# Patient Record
Sex: FEMALE | Race: WHITE | NOT HISPANIC OR LATINO | Employment: PART TIME | URBAN - METROPOLITAN AREA
[De-identification: names, ages, dates, MRNs, and addresses within clinical notes are randomized per-mention and may not be internally consistent; named-entity substitution may affect disease eponyms.]

---

## 2019-04-29 ENCOUNTER — OFFICE VISIT (OUTPATIENT)
Dept: NEUROLOGY | Facility: MEDICAL CENTER | Age: 69
End: 2019-04-29
Payer: MEDICARE

## 2019-04-29 VITALS
WEIGHT: 176 LBS | HEIGHT: 64 IN | TEMPERATURE: 97.5 F | BODY MASS INDEX: 30.05 KG/M2 | DIASTOLIC BLOOD PRESSURE: 72 MMHG | SYSTOLIC BLOOD PRESSURE: 146 MMHG | HEART RATE: 65 BPM | OXYGEN SATURATION: 96 %

## 2019-04-29 DIAGNOSIS — E78.1 HIGH TRIGLYCERIDES: ICD-10-CM

## 2019-04-29 DIAGNOSIS — E13.69 OTHER SPECIFIED DIABETES MELLITUS WITH OTHER SPECIFIED COMPLICATION, WITHOUT LONG-TERM CURRENT USE OF INSULIN (HCC): ICD-10-CM

## 2019-04-29 DIAGNOSIS — G62.9 NEUROPATHY: ICD-10-CM

## 2019-04-29 PROCEDURE — 99204 OFFICE O/P NEW MOD 45 MIN: CPT | Performed by: PSYCHIATRY & NEUROLOGY

## 2019-04-29 RX ORDER — GABAPENTIN 300 MG/1
300 CAPSULE ORAL 3 TIMES DAILY
COMMUNITY
End: 2022-12-31

## 2019-04-29 NOTE — PATIENT INSTRUCTIONS
"  The numbness is up to the high calf    IMPRESSION:    1. Neuropathy since age of 40+ ---   2. Familiar Triglyceridemia 1200 +before treament  3. Neuropathic Pain due to 1--status post surgeries for hammer toes 2017    PLAN/RECOMMENDATIONS:      Advise the patient to watch the skin of feet carefully daily  Avoid infection       ________________________________________________________________________    Fish Oil -- Omega 3 1000mg 6# daily ( if trouble tolerating Fish oil, Flaxseed oil is a alternative solution  ________________________________________________________________________    After fish oil 6000mg for one month, repeat lipid profile test one month after fish oil  Monitor the nerve damage with the level of tingling and neuropathy        SIGNATURE:  Felipe Singh      LIPID PANEL1/27/2015  [facility]  Component Name Value Ref Range   CHOLESTEROL 193 100 - 200 mg/dL   TRIGLYCERIDES 479 (H) 0 - 150 mg/dL   HDL CHOLESTEROL 33 (L) 45 - 100 mg/dL   NON-HDL CHOLESTEROL 160   Comment:  Based on current National Cholesterol Education Program  recommendations for LDL and the [facility] laboratory's upper limit  of normal for triglyceride of 150 mg/dL, the expected value  for Non-HDL Cholesterol is < 130 mg/dL. mg/dL   LDL CHOLESTEROL see below   Comment:  The Triglyceride result was greater than 400 mg/dL, therefore  the calculated LDL result is invalid. 0 - 100 mg/dL   CHOLESTEROL HDL RATI 5.8 (H)   Comment:  Data for various studies suggest that the ratio of the Total  CHOL/HDL may provide a \"rule of thumb\" guide in the predicting  increased risk to Coronary Heart Disease.  RISK                  MEN              WOMEN  1/2 risk              3.43             3.27  Average risk          4.97             4.44  2X Average            9.55             7.05  3X Average           23.99            11.04            CC:  Lito Baker, " M.D.      ________________________________________________________________________      Hypertriglyceridemia and peripheral neuropathy might be related-- please see the following reference    PREFERENCE whether to add one more medicine to control Hypertriglyceridemia    Combination therapy raises safety concerns. All statins (especially at higher doses) increase the risk of rhabdomyolysis; this risk may be compounded by fibrate use. Cerivastatin (Baycol) was withdrawn from the market because of reports of fatal rhabdomyolysis, often in patients also taking gem-fibrozil (Lopid). An increased risk also has been shown with rosuvastatin (Crestor).18,19 When combined with statins, gemfibrozil may increase serum statin levels by inhibiting statin metabolism.    Compared with gemfibrozil/statin therapy, fenofibrate/statin therapy has a lower incidence and reported rate of rhabdomyolysis and may be safer.20,21 However, long-term safety and outcome data for fibrate/statin combinations are lacking, and combination therapy should be used with caution. Patients should receive the lowest possible statin dosage, be monitored closely for side effects (e.g., muscle pain, brown urine), and be given the opportunity for proper informed consent.    Neuro Endocrinol Nellie. 2005 Dec;26(6):775-9. Hypertriglyceridemia and peripheral neuropathy in neurologically asymptomatic patients.  Hamilton HS1, [place] ST, Sapphire MS, Osvaldo RA.  ________________________________________________________________________

## 2019-04-29 NOTE — PROGRESS NOTES
"NEUROLOGY NOTE    Referring Physician  Lito Baker M.D.      CHIEF COMPLAINT:  The patient started having neuropathy since age of 39+  Underwent extensive work up in [facility]  hypertriglycemia is noticed  Chief Complaint   Patient presents with   • Establish Care     Neuropathy       PRESENT ILLNESS:   The patient started having neuropathy since age of 39+  Underwent extensive work up in [facility]  hypertriglycemia was noticed    Taking fish oil and alphalipoic acid for years  1800mg neurontin     The patient noticed sugar would make the numbness worse-- affecting the nose    PAST MEDICAL HISTORY:  No past medical history on file.    PAST SURGICAL HISTORY:  No past surgical history on file.    FAMILY HISTORY:  No family history on file.    SOCIAL HISTORY:  Social History     Social History   • Marital status: Single     Spouse name: N/A   • Number of children: N/A   • Years of education: N/A     Occupational History   • Not on file.     Social History Main Topics   • Smoking status: Not on file   • Smokeless tobacco: Not on file   • Alcohol use Not on file   • Drug use: Unknown   • Sexual activity: Not on file     Other Topics Concern   • Not on file     Social History Narrative   • No narrative on file     ALLERGIES:  Allergies   Allergen Reactions   • Aspirin Hives   • Celebrex [Celecoxib] Rash     All over body   • Kjvxqwkw-Pxactyh-Trpmja [Fluocinolone]      Torn achilles heel   • Levaquin      Torn achilles heel   • Other Drug Vomiting and Nausea     \"most pain medication (specifically opiods)\"     Increased heart rate and upset stomach     TOBHX  History   Smoking Status   • Not on file   Smokeless Tobacco   • Not on file     ALCHX  History   Alcohol use Not on file     DRUGHX  History   Drug use: Unknown           MEDICATIONS:  Current Outpatient Prescriptions   Medication   • gabapentin (NEURONTIN) 300 MG Cap   • FENOFIBRATE PO   • LOSARTAN POTASSIUM PO     No current facility-administered medications for " "this visit.        REVIEW OF SYSTEM:    Constitutional: Denies fevers, Denies weight changes   Eyes: Denies changes in vision, no eye pain   Ears/Nose/Throat/Mouth: Denies nasal congestion or sore throat   Cardiovascular: Denies chest pain or palpitations   Respiratory: Denies SOB.   Gastrointestinal/Hepatic: Denies abdominal pain, nausea, vomiting, diarrhea, constipation or GI bleeding   Genitourinary: Denies bladder dysfunction, dysuria or frequency   Musculoskeletal/Rheum: Denies joint pain and swelling   Skin/Breast: Denies rash, denies breast lumps or discharge   Neurological: polyneuropathy  Psychiatric: denies mood disorder   Endocrine: denies hx of diabetes or thyroid dysfunction   Heme/Oncology/Lymph Nodes: Denies enlarged lymph nodes, denies brusing or known bleeding disorder   Allergic/Immunologic: Denies hx of allergies         PHYSICAL AND NEUROLOGICAL EXMAINATIONS:  VITAL SIGNS: /72   Pulse 65   Temp 36.4 °C (97.5 °F) (Temporal)   Ht 1.613 m (5' 3.5\")   Wt 79.8 kg (176 lb)   SpO2 96%   BMI 30.69 kg/m²   CURRENT WEIGHT:   BMI: Body mass index is 30.69 kg/m².  PREVIOUS WEIGHTS:  Wt Readings from Last 25 Encounters:   04/29/19 79.8 kg (176 lb)       General appearance of patient: WDWN(+) NAD(+)    EYES  o Fundus : Papilledem(-) Exudates(-) Hemorrhage(-)  Nervous System  Orientation to time, place and person(+)  Memory normal(+)  Language: aphasia(-)  Knowledge: past(+) Current(+)  Attention(+)  Cranial Nerves  • Nerve 2: intact  • Nerve 3,4,6: intact  • Nerve 5 : intact  • Nerve 7: intact  • Nerve 8: intact  • Nerve 9 & 10: intact  • Nerve 11: intact  • Nerve 12: intact  Muscle Power and muscle tone: symmetric, normal in upper and lower  Sensory System: decreased sensation in the legs  Reflexes: symmetric throughout  Cerebellar Function FNP normal   Gait : Steady(+) TandemGait steady(+)  Heart and Vascular  Peripheral Vasucular system : Edema (-) Swelling(-)  RHB, Breathing sound " "clear  abdomen bowel sound normoactive  Extremities freely moveable  Joints no contracture       NEUROIMAGING: I reviewed the MRI/CT of brain       LAB:        The numbness is up to the high calf    IMPRESSION:    1. Neuropathy since age of 40+ ---   2. Familiar Triglyceridemia 1200 +before treament  3. Neuropathic Pain due to 1--status post surgeries for hammer toes 2017    PLAN/RECOMMENDATIONS:      Advise the patient to watch the skin of feet carefully daily  Avoid infection       ________________________________________________________________________    Fish Oil -- Omega 3 1000mg 6# daily ( if trouble tolerating Fish oil, Flaxseed oil is a alternative solution  ________________________________________________________________________    After fish oil 6000mg for one month, repeat lipid profile test one month after fish oil  Monitor the nerve damage with the level of tingling and neuropathy        SIGNATURE:  WilliamJames Singh      LIPID PANEL1/27/2015  [facility]  Component Name Value Ref Range   CHOLESTEROL 193 100 - 200 mg/dL   TRIGLYCERIDES 479 (H) 0 - 150 mg/dL   HDL CHOLESTEROL 33 (L) 45 - 100 mg/dL   NON-HDL CHOLESTEROL 160   Comment:  Based on current National Cholesterol Education Program  recommendations for LDL and the [facility] laboratory's upper limit  of normal for triglyceride of 150 mg/dL, the expected value  for Non-HDL Cholesterol is < 130 mg/dL. mg/dL   LDL CHOLESTEROL see below   Comment:  The Triglyceride result was greater than 400 mg/dL, therefore  the calculated LDL result is invalid. 0 - 100 mg/dL   CHOLESTEROL HDL RATI 5.8 (H)   Comment:  Data for various studies suggest that the ratio of the Total  CHOL/HDL may provide a \"rule of thumb\" guide in the predicting  increased risk to Coronary Heart Disease.  RISK                  MEN              WOMEN  1/2 risk              3.43             3.27  Average risk          4.97             4.44  2X Average            9.55             7.05  3X Average           " 23.99            11.04            CC:  Lito Baker M.D.      ________________________________________________________________________      Hypertriglyceridemia and peripheral neuropathy might be related-- please see the following reference    PREFERENCE whether to add one more medicine to control Hypertriglyceridemia    Combination therapy raises safety concerns. All statins (especially at higher doses) increase the risk of rhabdomyolysis; this risk may be compounded by fibrate use. Cerivastatin (Baycol) was withdrawn from the market because of reports of fatal rhabdomyolysis, often in patients also taking gem-fibrozil (Lopid). An increased risk also has been shown with rosuvastatin (Crestor).18,19 When combined with statins, gemfibrozil may increase serum statin levels by inhibiting statin metabolism.    Compared with gemfibrozil/statin therapy, fenofibrate/statin therapy has a lower incidence and reported rate of rhabdomyolysis and may be safer.20,21 However, long-term safety and outcome data for fibrate/statin combinations are lacking, and combination therapy should be used with caution. Patients should receive the lowest possible statin dosage, be monitored closely for side effects (e.g., muscle pain, brown urine), and be given the opportunity for proper informed consent.    Neuro Endocrinol Nellie. 2005 Dec;26(6):775-9. Hypertriglyceridemia and peripheral neuropathy in neurologically asymptomatic patients.  Hamilton HS1, [place] ST, Sapphire MS, Osvaldo RA.  ________________________________________________________________________

## 2019-05-01 ENCOUNTER — HOSPITAL ENCOUNTER (OUTPATIENT)
Dept: LAB | Facility: MEDICAL CENTER | Age: 69
End: 2019-05-01
Attending: PSYCHIATRY & NEUROLOGY
Payer: MEDICARE

## 2019-05-01 DIAGNOSIS — E78.1 HIGH TRIGLYCERIDES: ICD-10-CM

## 2019-05-01 DIAGNOSIS — G62.9 NEUROPATHY: ICD-10-CM

## 2019-05-01 DIAGNOSIS — E13.69 OTHER SPECIFIED DIABETES MELLITUS WITH OTHER SPECIFIED COMPLICATION, WITHOUT LONG-TERM CURRENT USE OF INSULIN (HCC): ICD-10-CM

## 2019-05-01 LAB
CHOLEST SERPL-MCNC: 203 MG/DL (ref 100–199)
FASTING STATUS PATIENT QL REPORTED: NORMAL
HDLC SERPL-MCNC: 32 MG/DL
LDLC SERPL CALC-MCNC: ABNORMAL MG/DL
TRIGL SERPL-MCNC: 461 MG/DL (ref 0–149)

## 2019-05-01 PROCEDURE — 84207 ASSAY OF VITAMIN B-6: CPT

## 2019-05-01 PROCEDURE — 36415 COLL VENOUS BLD VENIPUNCTURE: CPT

## 2019-05-01 PROCEDURE — 84425 ASSAY OF VITAMIN B-1: CPT

## 2019-05-01 PROCEDURE — 80061 LIPID PANEL: CPT

## 2019-05-04 LAB
VIT B1 BLD-MCNC: 144 NMOL/L (ref 70–180)
VIT B6 SERPL-MCNC: 206.1 NMOL/L (ref 20–125)

## 2019-05-27 ENCOUNTER — TELEPHONE (OUTPATIENT)
Dept: NEUROLOGY | Facility: MEDICAL CENTER | Age: 69
End: 2019-05-27

## 2019-05-27 DIAGNOSIS — E78.2 MIXED HYPERLIPIDEMIA: ICD-10-CM

## 2019-05-27 NOTE — TELEPHONE ENCOUNTER
Cholesterol,Tot 100 - 199 mg/dL 203     Triglycerides 0 - 149 mg/dL 461     HDL >=40 mg/dL 32         The last triglyceride was high  Assuming the patient has been taking higher dose of fish oil    I will give the patient another lipid profile tests

## 2019-05-28 NOTE — TELEPHONE ENCOUNTER
Called and LVM on documentation below -- I advised pt to call me back if she has any further questions or concerns.

## 2019-06-01 ENCOUNTER — HOSPITAL ENCOUNTER (OUTPATIENT)
Dept: LAB | Facility: MEDICAL CENTER | Age: 69
End: 2019-06-01
Attending: PSYCHIATRY & NEUROLOGY
Payer: MEDICARE

## 2019-06-01 DIAGNOSIS — E78.2 MIXED HYPERLIPIDEMIA: ICD-10-CM

## 2019-06-01 LAB
CHOLEST SERPL-MCNC: 177 MG/DL (ref 100–199)
FASTING STATUS PATIENT QL REPORTED: NORMAL
HDLC SERPL-MCNC: 33 MG/DL
LDLC SERPL CALC-MCNC: 91 MG/DL
TRIGL SERPL-MCNC: 265 MG/DL (ref 0–149)

## 2019-06-01 PROCEDURE — 80061 LIPID PANEL: CPT

## 2019-06-01 PROCEDURE — 36415 COLL VENOUS BLD VENIPUNCTURE: CPT

## 2019-07-19 ENCOUNTER — NON-PROVIDER VISIT (OUTPATIENT)
Dept: NEUROLOGY | Facility: MEDICAL CENTER | Age: 69
End: 2019-07-19
Payer: MEDICARE

## 2019-07-19 DIAGNOSIS — R20.0 NUMBNESS: ICD-10-CM

## 2019-07-19 DIAGNOSIS — G62.9 NEUROPATHY: ICD-10-CM

## 2019-07-19 PROCEDURE — 95885 MUSC TST DONE W/NERV TST LIM: CPT | Mod: 59 | Performed by: PSYCHIATRY & NEUROLOGY

## 2019-07-19 PROCEDURE — 95913 NRV CNDJ TEST 13/> STUDIES: CPT | Performed by: PSYCHIATRY & NEUROLOGY

## 2019-07-19 PROCEDURE — 95886 MUSC TEST DONE W/N TEST COMP: CPT | Performed by: PSYCHIATRY & NEUROLOGY

## 2019-07-19 NOTE — PROCEDURES
"NERVE CONDUCTION STUDIES AND ELECTROMYOGRAPHY REPORT  [facility]  07/19/19           IMPRESSION:  This is an abnormal electrodiagnostic study due to:  1. 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  2. Mild bilateral median neuropathy at the wrists of uncertain chronicity without evidence of chronic/active denervation of the abductor pollicis brevis.    Alberta Browning MD  Neurology - Neurophysiology  [facility]      REASON FOR REFERRAL:  Ms. Cyndi Mota 69 y.o. referred by Dr. Aiyana Singh for evaluation of chronic bilateral lower extremity numbness thought to be secondary to neuropathy since age 39.  She also has symptoms suggestive of possible bilateral carpal tunnel syndrome.  She has had a history of carpal tunnel syndrome status post surgical intervention.  She has a personal history of hypertriglyceridemia found incidentally during work-up at [facility] for her neuropathy.  Neuropathy symptoms have been stable since treatment of her hypertriglyceridemia.    Height: 5'3\"  Weight: 173 lbs    ELECTRODIAGNOSTIC EXAMINATION:  Nerve conduction studies (NCS) and electromyography (EMG) are utilized to evaluate direct or indirect damage to the peripheral nervous system. NCS are performed to measure the nerve(s) response(s) to electrostimulation across a given nerve segment. EMG evaluates the passive and active electrical activity of the muscle(s) in question.  Muscles are innervated by specific peripheral nerves and roots. Often times, several nerves the muscle to be examined in order to determine the presence or absence of the disease process. Furthermore, nerves and muscles may need to be tested in a [id number] comparison, as well as in additional extremities, as this may be crucial in characterizing the extent of the disease process, which may be diffuse or isolated and of varying degree of severity. The " extent of the neurodiagnostic exam is justified as it may help arrive to a proper diagnosis, which ultimately may contribute to better management of the patient. Therefore, the nerves to muscles examined during the study were medically necessary.    Unless otherwise noted, temperature of the extremity(s) study was monitored before and during the examination and remained between 32 and 36 degrees C for the upper extremities, and between 30 and 36 degrees C for the lower extremities. The patient tolerated testing well, without any complications.       NERVE CONDUCTION STUDY SUMMARY:  Selected nerves of the all 4 extremities are studied.    Normal bilateral median sensory responses.  Abnormal bilateral median motor responses due to prolonged distal latency, of uncertain chronicity.  Normal bilateral ulnar sensory and motor responses.  Unobtainable bilateral sural sensory responses.  Unobtainable bilateral common peroneal motor responses at the extensor digitorum brevis.  Normal bilateral common peroneal motor responses anterior.  Unobtainable bilateral tibial motor responses at the abductor pollicis brevis.      NEEDLE EMG SUMMARY:  Concentric needle study of selected bilateral upper extremity and right lower extremity muscles is performed.     Insertion is normal in all muscles sampled. With activation, there are normal morphology (amplitude/duration) motor unit action potentials firing with normal recruitment.       PATIENT DATA TABLES  Nerve Conduction Studies     Stim Site NR Onset (ms) Norm Onset (ms) O-P Amp (µV) Norm O-P Amp Site1 Site2 Delta-P (ms) Dist (cm) Jack (m/s) Norm Jack (m/s)   Left Sural Anti Sensory (Lat Mall)  35°C   Calf *NR  <4.6  >3 Calf Lat Mall  14.0  >40   Right Sural Anti Sensory (Lat Mall)   Calf *NR  <4.6  >3 Calf Lat Mall  14.0  >40        Stim Site NR Onset (ms) Norm Onset (ms) O-P Amp (mV) Norm O-P Amp Site1 Site2 Delta-0 (ms) Dist (cm) Jack (m/s) Norm Jack (m/s)   Left Median Motor (Abd  Poll Brev)  35.7°C   Wrist    *4.3 <4 8.8 >5 Elbow Wrist 4.0 23.0 57 >50   Elbow    8.3  8.8          Right Median Motor (Abd Poll Brev)  35°C   Wrist    *4.4 <4 7.7 >5 Elbow Wrist 4.7 24.0 51 >50   Elbow    9.1  7.3          Left Peroneal EDB Motor (Ext Dig Brev)   Ankle *NR  <6  >2.5 B Fib Ankle  0.0  >40   B Fib *NR     Poplt B Fib  0.0     Poplt *NR             Right Peroneal EDB Motor (Ext Dig Brev)   Ankle *NR  <6  >2.5 B Fib Ankle  29.0  >40   B Fib *NR     Poplt B Fib  0.0     Poplt *NR             Left Peroneal TA Motor (AntTibialis)  35°C   Fib Head    4.2 <4.5 4.6 .3 Poplit Fib Head 2.1 10.0 48 >40   Poplit    6.3  3.9          Right Peroneal TA Motor (AntTibialis)   Fib Head    3.0 <4.5 3.3 .3 Poplit Fib Head 2.5 10.0 40 >40   Poplit    5.5  2.7          Left Tibial Motor (Abd Shepard Brev)  35°C   Ankle *NR  <6  >4 Knee Ankle  0.0  >40   Knee *NR             Right Tibial Motor (Abd Shepard Brev)   Ankle *NR  <6  >4 Knee Ankle  0.0  >40   Knee *NR             Left Ulnar Motor (Abd Dig Min)  35.7°C   Wrist    2.5 <3.1 9.2 >7 B Elbow Wrist 2.8 17.0 61 >50   B Elbow    5.3  8.3  A Elbow B Elbow 2.4 10.0 42    A Elbow    7.7  7.8          Right Ulnar Motor (Abd Dig Min)  35.7°C   Wrist    3.0 <3.1 12.8 >7 B Elbow Wrist 3.6 18.0 50 >50   B Elbow    6.6  12.8  A Elbow B Elbow 2.2 10.0 45    A Elbow    8.8  12.7               Stim Site NR Peak (ms) Norm Peak (ms) P-T Amp (µV) Site1 Site2 Delta-P (ms) Norm Delta (ms)   Left Median/Ulnar Palm Comparison (Wrist - 8cm)  35.7°C   Median Palm    1.9 <2.3 29.4 Median Palm Ulnar Palm 0.0 <0.3   Ulnar Palm    1.9 <2.3 14.3       Right Median/Ulnar Palm Comparison (Wrist - 8cm)  35.7°C   Median Palm    2.1 <2.3 36.1 Median Palm Ulnar Palm 0.3 <0.3   Ulnar Palm    1.8 <2.3 35.5                                           Electromyography     Side Muscle Nerve Root Ins Act Fibs Psw Amp Dur Poly Recrt Int Pat Comment   Left AntTibialis Dp Br Fibular L4-5 Nml Nml Nml Nml Nml 0 Nml  Nml    Left Gastroc Tibial S1-2 Nml Nml Nml Nml Nml 0 Nml *25% pain   Left VastusLat Femoral L2-4 Nml Nml Nml Nml Nml 0 Nml Nml    Left GluteusMed SupGluteal L5-S1 Nml Nml Nml Nml Nml 0 Nml Nml    Left VastusMed Femoral L2-4 Nml Nml Nml Nml Nml 0 Nml Nml    Left Abd Poll Brev Median C8-T1 Nml Nml Nml Nml Nml 0 Nml Nml    Left 1stDorInt Ulnar C8-T1 Nml Nml Nml Nml Nml 0 Nml Nml    Right Abd Poll Brev Median C8-T1 Nml Nml Nml Nml Nml 0 Nml Nml    Right 1stDorInt Ulnar C8-T1 Nml Nml Nml Nml Nml 0 Nml Nml

## 2021-09-08 ENCOUNTER — APPOINTMENT (RX ONLY)
Dept: URBAN - NONMETROPOLITAN AREA CLINIC 1 | Facility: CLINIC | Age: 71
Setting detail: DERMATOLOGY
End: 2021-09-08

## 2021-09-08 DIAGNOSIS — D485 NEOPLASM OF UNCERTAIN BEHAVIOR OF SKIN: ICD-10-CM

## 2021-09-08 DIAGNOSIS — L82.0 INFLAMED SEBORRHEIC KERATOSIS: ICD-10-CM | Status: INADEQUATELY CONTROLLED

## 2021-09-08 DIAGNOSIS — L82.1 OTHER SEBORRHEIC KERATOSIS: ICD-10-CM | Status: STABLE

## 2021-09-08 DIAGNOSIS — L57.0 ACTINIC KERATOSIS: ICD-10-CM | Status: INADEQUATELY CONTROLLED

## 2021-09-08 DIAGNOSIS — L81.4 OTHER MELANIN HYPERPIGMENTATION: ICD-10-CM | Status: STABLE

## 2021-09-08 PROBLEM — D48.5 NEOPLASM OF UNCERTAIN BEHAVIOR OF SKIN: Status: ACTIVE | Noted: 2021-09-08

## 2021-09-08 PROCEDURE — ? LIQUID NITROGEN

## 2021-09-08 PROCEDURE — 11102 TANGNTL BX SKIN SINGLE LES: CPT | Mod: 59

## 2021-09-08 PROCEDURE — 99203 OFFICE O/P NEW LOW 30 MIN: CPT | Mod: 25

## 2021-09-08 PROCEDURE — 17003 DESTRUCT PREMALG LES 2-14: CPT | Mod: 59

## 2021-09-08 PROCEDURE — ? BIOPSY BY SHAVE METHOD

## 2021-09-08 PROCEDURE — 17110 DESTRUCTION B9 LES UP TO 14: CPT

## 2021-09-08 PROCEDURE — 17000 DESTRUCT PREMALG LESION: CPT | Mod: 59

## 2021-09-08 PROCEDURE — ? COUNSELING

## 2021-09-08 ASSESSMENT — LOCATION ZONE DERM
LOCATION ZONE: ARM
LOCATION ZONE: FACE
LOCATION ZONE: TRUNK

## 2021-09-08 ASSESSMENT — LOCATION DETAILED DESCRIPTION DERM
LOCATION DETAILED: LOWER STERNUM
LOCATION DETAILED: LEFT MEDIAL BREAST 10-11:00 REGION
LOCATION DETAILED: LEFT SUPERIOR CENTRAL BUCCAL CHEEK
LOCATION DETAILED: RIGHT MEDIAL BREAST 3-4:00 REGION
LOCATION DETAILED: RIGHT SUPERIOR MEDIAL UPPER BACK
LOCATION DETAILED: LEFT SUPERIOR MEDIAL FOREHEAD
LOCATION DETAILED: LEFT LATERAL MANDIBULAR CHEEK
LOCATION DETAILED: LEFT VENTRAL PROXIMAL FOREARM
LOCATION DETAILED: LEFT MEDIAL BREAST 9-10:00 REGION
LOCATION DETAILED: RIGHT SUPERIOR CENTRAL MALAR CHEEK
LOCATION DETAILED: RIGHT MEDIAL BREAST 4-5:00 REGION
LOCATION DETAILED: LEFT CENTRAL MANDIBULAR CHEEK
LOCATION DETAILED: LEFT CENTRAL MALAR CHEEK

## 2021-09-08 ASSESSMENT — LOCATION SIMPLE DESCRIPTION DERM
LOCATION SIMPLE: LEFT FOREHEAD
LOCATION SIMPLE: LEFT BREAST
LOCATION SIMPLE: RIGHT UPPER BACK
LOCATION SIMPLE: RIGHT CHEEK
LOCATION SIMPLE: RIGHT BREAST
LOCATION SIMPLE: CHEST
LOCATION SIMPLE: LEFT CHEEK
LOCATION SIMPLE: LEFT FOREARM

## 2021-09-08 NOTE — PROCEDURE: MIPS QUALITY
Quality 126: Diabetes Mellitus: Diabetic Foot And Ankle Care, Peripheral Neuropathy - Neurological Evaluation: Lower Extremity Neurological Exam Performed
Quality 110: Preventive Care And Screening: Influenza Immunization: Influenza Immunization Administered during Influenza season
Quality 226: Preventive Care And Screening: Tobacco Use: Screening And Cessation Intervention: Patient screened for tobacco use, is a smoker AND did not received Cessation Counseling for Medical Reasons
Detail Level: Generalized
Quality 130: Documentation Of Current Medications In The Medical Record: Current Medications Documented
Quality 265: Biopsy Follow-Up: Biopsy results reviewed, communicated, tracked, and documented
Quality 111:Pneumonia Vaccination Status For Older Adults: Pneumococcal Vaccination Previously Received

## 2021-09-08 NOTE — PROCEDURE: BIOPSY BY SHAVE METHOD
Detail Level: Detailed
Depth Of Biopsy: dermis
Was A Bandage Applied: Yes
Size Of Lesion In Cm: 0.6
X Size Of Lesion In Cm: 0.5
Biopsy Type: H and E
Biopsy Method: Dermablade
Anesthesia Type: 1% lidocaine with epinephrine and a 1:10 solution of 8.4% sodium bicarbonate
Anesthesia Volume In Cc: 1
Additional Anesthesia Volume In Cc (Will Not Render If 0): 0
Hemostasis: Drysol
Wound Care: Vaseline
Dressing: Band-Aid
Destruction After The Procedure: No
Type Of Destruction Used: Electrodesiccation and Curettage
Curettage Text: The wound bed was treated with curettage after the biopsy was done.
Cryotherapy Text: The wound bed was treated with cryotherapy after the biopsy was performed.
Electrodesiccation Text: The wound bed was treated with electrodesiccation after the biopsy was performed.
Electrodesiccation And Curettage Text: The wound bed was treated with electrodesiccation and curettage after the biopsy was performed.
Silver Nitrate Text: The wound bed was treated with silver nitrate after the biopsy was performed.
Lab: [id number]
Lab Facility: [id number]
Consent: Written consent was obtained and risks were reviewed including but not limited to scarring, infection, bleeding, scabbing, incomplete removal, nerve damage and allergy to anesthesia.
Post-Care Instructions: I reviewed with the patient in detail post-care instructions. Patient is to keep the biopsy site dry overnight, and then apply Polysporin twice daily until healed. Patient may apply hydrogen peroxide soaks to remove any crusting.
Notification Instructions: Patient will be notified of biopsy results. However, patient instructed to call the office if not contacted within 2 weeks.
Billing Type: Third-Party Bill
Information: Selecting Yes will display possible errors in your note based on the variables you have selected. This validation is only offered as a suggestion for you. PLEASE NOTE THAT THE VALIDATION TEXT WILL BE REMOVED WHEN YOU FINALIZE YOUR NOTE. IF YOU WANT TO FAX A PRELIMINARY NOTE YOU WILL NEED TO TOGGLE THIS TO 'NO' IF YOU DO NOT WANT IT IN YOUR FAXED NOTE.

## 2021-09-08 NOTE — HPI: SKIN LESION
Is This A New Presentation, Or A Follow-Up?: Skin Lesion
What Type Of Note Output Would You Prefer (Optional)?: Bullet Format
How Severe Is Your Skin Lesion?: mild
Has Your Skin Lesion Been Treated?: not been treated
Which Family Member (Optional)?: Daughter, father

## 2021-09-08 NOTE — PROCEDURE: LIQUID NITROGEN
Show Applicator Variable?: Yes
Detail Level: Simple
Consent: The patient's consent was obtained including but not limited to risks of crusting, scabbing, blistering, scarring, darker or lighter pigmentary change, recurrence, incomplete removal and infection.
Duration Of Freeze Thaw-Cycle (Seconds): 0
Number Of Freeze-Thaw Cycles: 2 freeze-thaw cycles
Render Post-Care Instructions In Note?: no
Post-Care Instructions: I reviewed with the patient in detail post-care instructions. Patient is to wear sunprotection, and avoid picking at any of the treated lesions. Pt may apply Vaseline to crusted or scabbing areas.
Medical Necessity Clause: This procedure was medically necessary because the lesions that were treated were:
Medical Necessity Information: It is in your best interest to select a reason for this procedure from the list below. All of these items fulfill various CMS LCD requirements except the new and changing color options.

## 2021-12-08 PROBLEM — M65.351 TRIGGER LITTLE FINGER OF RIGHT HAND: Status: ACTIVE | Noted: 2021-12-08

## 2021-12-08 PROBLEM — M18.11 PRIMARY OSTEOARTHRITIS OF FIRST CARPOMETACARPAL JOINT OF RIGHT HAND: Status: ACTIVE | Noted: 2021-12-08

## 2021-12-08 PROBLEM — M65.331 TRIGGER MIDDLE FINGER OF RIGHT HAND: Status: ACTIVE | Noted: 2021-12-08

## 2022-05-23 PROBLEM — M65.332 TRIGGER MIDDLE FINGER OF LEFT HAND: Status: ACTIVE | Noted: 2022-05-23

## 2022-05-23 PROBLEM — M65.352 TRIGGER LITTLE FINGER OF LEFT HAND: Status: ACTIVE | Noted: 2022-05-23

## 2022-05-23 PROBLEM — M18.12 PRIMARY OSTEOARTHRITIS OF FIRST CARPOMETACARPAL JOINT OF LEFT HAND: Status: ACTIVE | Noted: 2022-05-23

## 2022-12-30 ENCOUNTER — HOSPITAL ENCOUNTER (OUTPATIENT)
Facility: MEDICAL CENTER | Age: 72
End: 2022-12-31
Attending: STUDENT IN AN ORGANIZED HEALTH CARE EDUCATION/TRAINING PROGRAM | Admitting: STUDENT IN AN ORGANIZED HEALTH CARE EDUCATION/TRAINING PROGRAM
Payer: MEDICARE

## 2022-12-30 ENCOUNTER — APPOINTMENT (OUTPATIENT)
Dept: RADIOLOGY | Facility: MEDICAL CENTER | Age: 72
End: 2022-12-30
Attending: STUDENT IN AN ORGANIZED HEALTH CARE EDUCATION/TRAINING PROGRAM
Payer: MEDICARE

## 2022-12-30 ENCOUNTER — APPOINTMENT (OUTPATIENT)
Dept: RADIOLOGY | Facility: MEDICAL CENTER | Age: 72
End: 2022-12-30
Payer: MEDICARE

## 2022-12-30 DIAGNOSIS — R07.9 CHEST PAIN, UNSPECIFIED TYPE: ICD-10-CM

## 2022-12-30 LAB
ALBUMIN SERPL BCP-MCNC: 4.5 G/DL (ref 3.2–4.9)
ALBUMIN/GLOB SERPL: 1.6 G/DL
ALP SERPL-CCNC: 47 U/L (ref 30–99)
ALT SERPL-CCNC: 20 U/L (ref 2–50)
ANION GAP SERPL CALC-SCNC: 16 MMOL/L (ref 7–16)
AST SERPL-CCNC: 28 U/L (ref 12–45)
BASOPHILS # BLD AUTO: 0.2 % (ref 0–1.8)
BASOPHILS # BLD: 0.02 K/UL (ref 0–0.12)
BILIRUB SERPL-MCNC: 0.2 MG/DL (ref 0.1–1.5)
BUN SERPL-MCNC: 27 MG/DL (ref 8–22)
CALCIUM ALBUM COR SERPL-MCNC: 9.5 MG/DL (ref 8.5–10.5)
CALCIUM SERPL-MCNC: 9.9 MG/DL (ref 8.5–10.5)
CHLORIDE SERPL-SCNC: 105 MMOL/L (ref 96–112)
CO2 SERPL-SCNC: 20 MMOL/L (ref 20–33)
CREAT SERPL-MCNC: 1.13 MG/DL (ref 0.5–1.4)
EKG IMPRESSION: NORMAL
EOSINOPHIL # BLD AUTO: 0.01 K/UL (ref 0–0.51)
EOSINOPHIL NFR BLD: 0.1 % (ref 0–6.9)
ERYTHROCYTE [DISTWIDTH] IN BLOOD BY AUTOMATED COUNT: 42.9 FL (ref 35.9–50)
GFR SERPLBLD CREATININE-BSD FMLA CKD-EPI: 51 ML/MIN/1.73 M 2
GLOBULIN SER CALC-MCNC: 2.8 G/DL (ref 1.9–3.5)
GLUCOSE SERPL-MCNC: 256 MG/DL (ref 65–99)
HCT VFR BLD AUTO: 40.3 % (ref 37–47)
HGB BLD-MCNC: 13.7 G/DL (ref 12–16)
IMM GRANULOCYTES # BLD AUTO: 0.09 K/UL (ref 0–0.11)
IMM GRANULOCYTES NFR BLD AUTO: 1 % (ref 0–0.9)
LYMPHOCYTES # BLD AUTO: 1.18 K/UL (ref 1–4.8)
LYMPHOCYTES NFR BLD: 12.5 % (ref 22–41)
MCH RBC QN AUTO: 30.5 PG (ref 27–33)
MCHC RBC AUTO-ENTMCNC: 34 G/DL (ref 33.6–35)
MCV RBC AUTO: 89.8 FL (ref 81.4–97.8)
MONOCYTES # BLD AUTO: 0.1 K/UL (ref 0–0.85)
MONOCYTES NFR BLD AUTO: 1.1 % (ref 0–13.4)
NEUTROPHILS # BLD AUTO: 8.01 K/UL (ref 2–7.15)
NEUTROPHILS NFR BLD: 85.1 % (ref 44–72)
NRBC # BLD AUTO: 0 K/UL
NRBC BLD-RTO: 0 /100 WBC
PLATELET # BLD AUTO: 181 K/UL (ref 164–446)
PMV BLD AUTO: 11.3 FL (ref 9–12.9)
POTASSIUM SERPL-SCNC: 4.3 MMOL/L (ref 3.6–5.5)
PROT SERPL-MCNC: 7.3 G/DL (ref 6–8.2)
RBC # BLD AUTO: 4.49 M/UL (ref 4.2–5.4)
SODIUM SERPL-SCNC: 141 MMOL/L (ref 135–145)
TROPONIN T SERPL-MCNC: 15 NG/L (ref 6–19)
WBC # BLD AUTO: 9.4 K/UL (ref 4.8–10.8)

## 2022-12-30 PROCEDURE — 80053 COMPREHEN METABOLIC PANEL: CPT

## 2022-12-30 PROCEDURE — 96372 THER/PROPH/DIAG INJ SC/IM: CPT | Mod: XU

## 2022-12-30 PROCEDURE — 71045 X-RAY EXAM CHEST 1 VIEW: CPT

## 2022-12-30 PROCEDURE — 93005 ELECTROCARDIOGRAM TRACING: CPT | Performed by: STUDENT IN AN ORGANIZED HEALTH CARE EDUCATION/TRAINING PROGRAM

## 2022-12-30 PROCEDURE — 93005 ELECTROCARDIOGRAM TRACING: CPT

## 2022-12-30 PROCEDURE — 84484 ASSAY OF TROPONIN QUANT: CPT

## 2022-12-30 PROCEDURE — 85025 COMPLETE CBC W/AUTO DIFF WBC: CPT

## 2022-12-30 PROCEDURE — 99285 EMERGENCY DEPT VISIT HI MDM: CPT

## 2022-12-30 PROCEDURE — 36415 COLL VENOUS BLD VENIPUNCTURE: CPT

## 2022-12-31 ENCOUNTER — APPOINTMENT (OUTPATIENT)
Dept: RADIOLOGY | Facility: MEDICAL CENTER | Age: 72
End: 2022-12-31
Attending: STUDENT IN AN ORGANIZED HEALTH CARE EDUCATION/TRAINING PROGRAM
Payer: MEDICARE

## 2022-12-31 VITALS
RESPIRATION RATE: 16 BRPM | WEIGHT: 173.94 LBS | OXYGEN SATURATION: 93 % | TEMPERATURE: 97.3 F | SYSTOLIC BLOOD PRESSURE: 129 MMHG | HEIGHT: 63 IN | BODY MASS INDEX: 30.82 KG/M2 | DIASTOLIC BLOOD PRESSURE: 62 MMHG | HEART RATE: 72 BPM

## 2022-12-31 PROBLEM — E11.65 UNCONTROLLED DIABETES MELLITUS WITH HYPERGLYCEMIA (HCC): Status: ACTIVE | Noted: 2022-12-31

## 2022-12-31 PROBLEM — M19.90 OSTEOARTHRITIS: Status: ACTIVE | Noted: 2022-12-31

## 2022-12-31 PROBLEM — R07.9 CHEST PAIN, RULE OUT ACUTE MYOCARDIAL INFARCTION: Status: RESOLVED | Noted: 2022-12-31 | Resolved: 2022-12-31

## 2022-12-31 PROBLEM — E11.65 UNCONTROLLED DIABETES MELLITUS WITH HYPERGLYCEMIA (HCC): Status: RESOLVED | Noted: 2022-12-31 | Resolved: 2022-12-31

## 2022-12-31 PROBLEM — R07.9 PAIN IN THE CHEST: Status: RESOLVED | Noted: 2022-12-31 | Resolved: 2022-12-31

## 2022-12-31 PROBLEM — R07.9 CHEST PAIN, RULE OUT ACUTE MYOCARDIAL INFARCTION: Status: ACTIVE | Noted: 2022-12-31

## 2022-12-31 PROBLEM — R07.9 PAIN IN THE CHEST: Status: ACTIVE | Noted: 2022-12-31

## 2022-12-31 PROBLEM — E66.01 MORBID OBESITY (HCC): Status: ACTIVE | Noted: 2022-12-31

## 2022-12-31 LAB
GLUCOSE BLD STRIP.AUTO-MCNC: 161 MG/DL (ref 65–99)
GLUCOSE BLD STRIP.AUTO-MCNC: 187 MG/DL (ref 65–99)
TROPONIN T SERPL-MCNC: 16 NG/L (ref 6–19)

## 2022-12-31 PROCEDURE — 36415 COLL VENOUS BLD VENIPUNCTURE: CPT

## 2022-12-31 PROCEDURE — 700102 HCHG RX REV CODE 250 W/ 637 OVERRIDE(OP): Performed by: HOSPITALIST

## 2022-12-31 PROCEDURE — A9270 NON-COVERED ITEM OR SERVICE: HCPCS | Performed by: HOSPITALIST

## 2022-12-31 PROCEDURE — A9270 NON-COVERED ITEM OR SERVICE: HCPCS | Performed by: STUDENT IN AN ORGANIZED HEALTH CARE EDUCATION/TRAINING PROGRAM

## 2022-12-31 PROCEDURE — 74175 CTA ABDOMEN W/CONTRAST: CPT

## 2022-12-31 PROCEDURE — 700117 HCHG RX CONTRAST REV CODE 255: Performed by: STUDENT IN AN ORGANIZED HEALTH CARE EDUCATION/TRAINING PROGRAM

## 2022-12-31 PROCEDURE — 82962 GLUCOSE BLOOD TEST: CPT

## 2022-12-31 PROCEDURE — 700102 HCHG RX REV CODE 250 W/ 637 OVERRIDE(OP): Performed by: STUDENT IN AN ORGANIZED HEALTH CARE EDUCATION/TRAINING PROGRAM

## 2022-12-31 PROCEDURE — G0378 HOSPITAL OBSERVATION PER HR: HCPCS

## 2022-12-31 PROCEDURE — 84484 ASSAY OF TROPONIN QUANT: CPT

## 2022-12-31 PROCEDURE — 96374 THER/PROPH/DIAG INJ IV PUSH: CPT | Mod: XU

## 2022-12-31 PROCEDURE — 78452 HT MUSCLE IMAGE SPECT MULT: CPT

## 2022-12-31 PROCEDURE — 99236 HOSP IP/OBS SAME DATE HI 85: CPT | Performed by: STUDENT IN AN ORGANIZED HEALTH CARE EDUCATION/TRAINING PROGRAM

## 2022-12-31 PROCEDURE — 96372 THER/PROPH/DIAG INJ SC/IM: CPT | Mod: XU

## 2022-12-31 PROCEDURE — 700111 HCHG RX REV CODE 636 W/ 250 OVERRIDE (IP): Performed by: STUDENT IN AN ORGANIZED HEALTH CARE EDUCATION/TRAINING PROGRAM

## 2022-12-31 PROCEDURE — 99285 EMERGENCY DEPT VISIT HI MDM: CPT

## 2022-12-31 RX ORDER — MORPHINE SULFATE 4 MG/ML
4 INJECTION INTRAVENOUS ONCE
Status: COMPLETED | OUTPATIENT
Start: 2022-12-31 | End: 2022-12-31

## 2022-12-31 RX ORDER — LEVOTHYROXINE SODIUM 0.03 MG/1
25 TABLET ORAL
Status: DISCONTINUED | OUTPATIENT
Start: 2022-12-31 | End: 2022-12-31 | Stop reason: HOSPADM

## 2022-12-31 RX ORDER — ACETAMINOPHEN 325 MG/1
650 TABLET ORAL EVERY 6 HOURS PRN
Status: DISCONTINUED | OUTPATIENT
Start: 2022-12-31 | End: 2022-12-31 | Stop reason: HOSPADM

## 2022-12-31 RX ORDER — ACETAMINOPHEN 500 MG
1000 TABLET ORAL EVERY 6 HOURS PRN
COMMUNITY

## 2022-12-31 RX ORDER — GABAPENTIN 300 MG/1
600-1200 CAPSULE ORAL 3 TIMES DAILY
Status: DISCONTINUED | OUTPATIENT
Start: 2022-12-31 | End: 2022-12-31

## 2022-12-31 RX ORDER — GABAPENTIN 300 MG/1
600 CAPSULE ORAL 2 TIMES DAILY
Status: DISCONTINUED | OUTPATIENT
Start: 2022-12-31 | End: 2022-12-31

## 2022-12-31 RX ORDER — LOSARTAN POTASSIUM 25 MG/1
25 TABLET ORAL DAILY
COMMUNITY

## 2022-12-31 RX ORDER — IRON HEME POLYPEPTIDE/FOLIC AC 12-1MG
5000 TABLET ORAL DAILY
COMMUNITY

## 2022-12-31 RX ORDER — MULTIVIT WITH MINERALS/LUTEIN
1000 TABLET ORAL DAILY
COMMUNITY

## 2022-12-31 RX ORDER — GABAPENTIN 300 MG/1
600-1200 CAPSULE ORAL 3 TIMES DAILY
COMMUNITY

## 2022-12-31 RX ORDER — METFORMIN HYDROCHLORIDE 750 MG/1
750 TABLET, EXTENDED RELEASE ORAL DAILY
COMMUNITY

## 2022-12-31 RX ORDER — BUPROPION HYDROCHLORIDE 150 MG/1
150 TABLET, EXTENDED RELEASE ORAL
Status: DISCONTINUED | OUTPATIENT
Start: 2022-12-31 | End: 2022-12-31 | Stop reason: HOSPADM

## 2022-12-31 RX ORDER — GABAPENTIN 400 MG/1
1200 CAPSULE ORAL
Status: DISCONTINUED | OUTPATIENT
Start: 2022-12-31 | End: 2022-12-31 | Stop reason: HOSPADM

## 2022-12-31 RX ORDER — LOSARTAN POTASSIUM 50 MG/1
25 TABLET ORAL DAILY
Status: DISCONTINUED | OUTPATIENT
Start: 2022-12-31 | End: 2022-12-31 | Stop reason: HOSPADM

## 2022-12-31 RX ORDER — GABAPENTIN 300 MG/1
600 CAPSULE ORAL 2 TIMES DAILY
Status: DISCONTINUED | OUTPATIENT
Start: 2022-12-31 | End: 2022-12-31 | Stop reason: HOSPADM

## 2022-12-31 RX ORDER — DIPHENHYDRAMINE HCL 25 MG
50 TABLET ORAL
COMMUNITY

## 2022-12-31 RX ORDER — FENOFIBRATE 200 MG/1
200 CAPSULE ORAL DAILY
COMMUNITY

## 2022-12-31 RX ORDER — MAGNESIUM 200 MG
200 TABLET ORAL DAILY
COMMUNITY

## 2022-12-31 RX ORDER — MORPHINE SULFATE 4 MG/ML
1 INJECTION INTRAVENOUS EVERY 4 HOURS PRN
Status: DISCONTINUED | OUTPATIENT
Start: 2022-12-31 | End: 2022-12-31 | Stop reason: HOSPADM

## 2022-12-31 RX ORDER — BUPROPION HYDROCHLORIDE 150 MG/1
150 TABLET, EXTENDED RELEASE ORAL
Status: DISCONTINUED | OUTPATIENT
Start: 2022-12-31 | End: 2022-12-31

## 2022-12-31 RX ORDER — LEVOTHYROXINE SODIUM 0.03 MG/1
25 TABLET ORAL
COMMUNITY

## 2022-12-31 RX ORDER — PERPHENAZINE 16 MG
1200 TABLET ORAL DAILY
COMMUNITY

## 2022-12-31 RX ORDER — BUPROPION HYDROCHLORIDE 150 MG/1
150 TABLET ORAL
COMMUNITY
Start: 2022-08-01

## 2022-12-31 RX ADMIN — IOHEXOL 80 ML: 350 INJECTION, SOLUTION INTRAVENOUS at 00:11

## 2022-12-31 RX ADMIN — BUPROPION HYDROCHLORIDE 150 MG: 150 TABLET, EXTENDED RELEASE ORAL at 11:57

## 2022-12-31 RX ADMIN — INSULIN HUMAN 1 UNITS: 100 INJECTION, SOLUTION PARENTERAL at 03:17

## 2022-12-31 RX ADMIN — GABAPENTIN 600 MG: 300 CAPSULE ORAL at 11:57

## 2022-12-31 RX ADMIN — MORPHINE SULFATE 4 MG: 4 INJECTION INTRAVENOUS at 00:15

## 2022-12-31 RX ADMIN — LEVOTHYROXINE SODIUM 25 MCG: 0.03 TABLET ORAL at 05:03

## 2022-12-31 ASSESSMENT — LIFESTYLE VARIABLES
HAVE YOU EVER FELT YOU SHOULD CUT DOWN ON YOUR DRINKING: NO
ALCOHOL_USE: NO
TOTAL SCORE: 0
DOES PATIENT WANT TO STOP DRINKING: NO
EVER FELT BAD OR GUILTY ABOUT YOUR DRINKING: NO
HAVE PEOPLE ANNOYED YOU BY CRITICIZING YOUR DRINKING: NO
AVERAGE NUMBER OF DAYS PER WEEK YOU HAVE A DRINK CONTAINING ALCOHOL: 0
CONSUMPTION TOTAL: NEGATIVE
TOTAL SCORE: 0
TOTAL SCORE: 0
HOW MANY TIMES IN THE PAST YEAR HAVE YOU HAD 5 OR MORE DRINKS IN A DAY: 0
ON A TYPICAL DAY WHEN YOU DRINK ALCOHOL HOW MANY DRINKS DO YOU HAVE: 0
EVER HAD A DRINK FIRST THING IN THE MORNING TO STEADY YOUR NERVES TO GET RID OF A HANGOVER: NO

## 2022-12-31 ASSESSMENT — PATIENT HEALTH QUESTIONNAIRE - PHQ9
SUM OF ALL RESPONSES TO PHQ9 QUESTIONS 1 AND 2: 0
1. LITTLE INTEREST OR PLEASURE IN DOING THINGS: NOT AT ALL
2. FEELING DOWN, DEPRESSED, IRRITABLE, OR HOPELESS: NOT AT ALL
SUM OF ALL RESPONSES TO PHQ9 QUESTIONS 1 AND 2: 0
2. FEELING DOWN, DEPRESSED, IRRITABLE, OR HOPELESS: NOT AT ALL
1. LITTLE INTEREST OR PLEASURE IN DOING THINGS: NOT AT ALL

## 2022-12-31 ASSESSMENT — ENCOUNTER SYMPTOMS
NEUROLOGICAL NEGATIVE: 1
RESPIRATORY NEGATIVE: 1
GASTROINTESTINAL NEGATIVE: 1
EYES NEGATIVE: 1
PSYCHIATRIC NEGATIVE: 1
MUSCULOSKELETAL NEGATIVE: 1

## 2022-12-31 ASSESSMENT — PAIN DESCRIPTION - PAIN TYPE
TYPE: ACUTE PAIN
TYPE: ACUTE PAIN

## 2022-12-31 ASSESSMENT — FIBROSIS 4 INDEX: FIB4 SCORE: 2.53

## 2022-12-31 NOTE — ED TRIAGE NOTES
"Chief Complaint   Patient presents with    Back Pain     Back pain started at 7 PM tonight and radiating to the chest. EKG from EMS show ST depression on leads V4-V6. Pain improved upon arrival at ER.     BIB EMS for above complaint. Had cortisone injection in the sacral area this morning. Took benadryl and tylenol at 7 PM.    Pulse (!) 106   Temp 36.7 °C (98 °F) (Temporal)   Resp 19   Ht 1.6 m (5' 3\")   Wt 79.8 kg (176 lb)   SpO2 96%   BMI 31.18 kg/m²     "

## 2022-12-31 NOTE — PROGRESS NOTES
"Assumed care. A/O. States feels \"better\" than agustin admit. NPO for stress test scheduled at 10am.  "

## 2022-12-31 NOTE — H&P
Hospital Medicine History & Physical Note    Date of Service  12/31/2022    Primary Care Physician  Juan Hernandez M.D.    Consultants  None    Code Status  Full Code    Chief Complaint  Chief Complaint   Patient presents with    Back Pain     Back pain started at 7 PM tonight and radiating to the chest. EKG from EMS show ST depression on leads V4-V6. Pain improved upon arrival at ER.       History of Presenting Illness  Cyndi Mota is a 72 y.o. female who presented 12/30/2022 with chest pain and back pain.  Patient has a history of osteoarthritis for which she follows up with primary care outpatient.  She normally receives steroid shots every few months.  Today she received 3 cortisone shots in her lower back.  She then drove home and several hours later she began to have back pain that radiated to her chest diffusely.  Describes the chest pain as heavy.  No associated diaphoresis nausea vomiting.  She states that she usually has a reaction to steroid shots such as flushing generalized weakness fatigue.  However she has not ever had chest pain like this after a steroid shot, this prompted her to come to the ED for evaluation.    Reports that she has had a stress test 10 years ago to which she was told it was unremarkable.  No history of cardiac stents placed.    In the ED, patient found to have normal vital signs.  Chest x-ray negative.  EKG showing normal sinus rhythm with no T wave changes to suggest ischemia.  CT angio of aorta negative for aneurysm and dissection.    Patient seen at bedside, states that she is currently chest pain-free and is asymptomatic at this time.  Stated that the morphine resolved her pain.    I discussed the plan of care with patient.    Review of Systems  Review of Systems   Constitutional:  Positive for malaise/fatigue.   HENT: Negative.     Eyes: Negative.    Respiratory: Negative.     Cardiovascular:  Positive for chest pain.   Gastrointestinal: Negative.   "  Genitourinary: Negative.    Musculoskeletal: Negative.    Skin: Negative.    Neurological: Negative.    Endo/Heme/Allergies: Negative.    Psychiatric/Behavioral: Negative.       Past Medical History  No pertinent medical history    Surgical History  No pertinent surgical history    Family History   Family history reviewed with patient. There is no family history that is pertinent to the chief complaint.     Social History   reports that she has never smoked. She has never used smokeless tobacco.    Allergies  Allergies   Allergen Reactions    Aspirin Hives    Celebrex [Celecoxib] Rash     All over body    Vrzdnofc-Emhauhz-Gywkkw [Fluocinolone]      Torn achilles heel    Levaquin      Torn achilles heel    Other Drug Vomiting and Nausea     \"most pain medication (specifically opiods)\"     Increased heart rate and upset stomach       Medications  Prior to Admission Medications   Prescriptions Last Dose Informant Patient Reported? Taking?   FENOFIBRATE PO   Yes No   Sig: Take  by mouth.   LOSARTAN POTASSIUM PO   Yes No   Sig: Take  by mouth.   gabapentin (NEURONTIN) 300 MG Cap   Yes No   Sig: Take 300 mg by mouth 3 times a day.      Facility-Administered Medications: None       Physical Exam  Temp:  [36.7 °C (98 °F)] 36.7 °C (98 °F)  Pulse:  [] 90  Resp:  [18-25] 25  BP: (138-161)/(58-69) 138/61  SpO2:  [91 %-96 %] 93 %  Blood Pressure : 138/61   Temperature: 36.7 °C (98 °F)   Pulse: 90   Respiration: (!) 25   Pulse Oximetry: 93 %       Physical Exam  Constitutional:       Appearance: Normal appearance. She is obese.   HENT:      Head: Normocephalic.      Nose: Nose normal.      Mouth/Throat:      Mouth: Mucous membranes are moist.   Eyes:      Pupils: Pupils are equal, round, and reactive to light.   Cardiovascular:      Rate and Rhythm: Normal rate and regular rhythm.   Pulmonary:      Effort: Pulmonary effort is normal.      Breath sounds: Normal breath sounds.   Abdominal:      General: Abdomen is flat. " Bowel sounds are normal.      Palpations: Abdomen is soft.   Musculoskeletal:         General: Normal range of motion.      Cervical back: Neck supple.   Skin:     General: Skin is warm.   Neurological:      General: No focal deficit present.      Mental Status: She is alert and oriented to person, place, and time. Mental status is at baseline.   Psychiatric:         Mood and Affect: Mood normal.         Behavior: Behavior normal.         Thought Content: Thought content normal.         Judgment: Judgment normal.       Laboratory:  Recent Labs     12/30/22 2204   WBC 9.4   RBC 4.49   HEMOGLOBIN 13.7   HEMATOCRIT 40.3   MCV 89.8   MCH 30.5   MCHC 34.0   RDW 42.9   PLATELETCT 181   MPV 11.3     Recent Labs     12/30/22 2204   SODIUM 141   POTASSIUM 4.3   CHLORIDE 105   CO2 20   GLUCOSE 256*   BUN 27*   CREATININE 1.13   CALCIUM 9.9     Recent Labs     12/30/22 2204   ALTSGPT 20   ASTSGOT 28   ALKPHOSPHAT 47   TBILIRUBIN 0.2   GLUCOSE 256*         No results for input(s): NTPROBNP in the last 72 hours.      Recent Labs     12/30/22 2204 12/31/22  0037   TROPONINT 15 16       Imaging:  CT-CTA COMPLETE THORACOABDOMINAL AORTA   Final Result      1.  No evidence of aortic aneurysm or dissection.      2.  Minimal atherosclerotic plaque of the abdominal aorta.      3.  Versus chronic change of the left anterior descending coronary artery.      4.  Fatty liver.      5.  Solid enhancing splenic masses measuring 2 and 1 cm in size. 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      6.  Bilateral pulmonary nodules measuring up to 5 mm in size.      Fleischner Society pulmonary nodule recommendations:   Low Risk: No routine follow-up      High Risk: Optional CT at 12 months      Comments: Use most suspicious nodule as guide to management. Follow-up intervals may vary according to size and risk.      Low Risk - Minimal or absent history of smoking and of other known risk factors.      High Risk - History of smoking or of other known  risk factors.      Note: These recommendations do not apply to lung cancer screening, patients with immunosuppression, or patients with known primary cancer.      Fleischner Society 2017 Guidelines for Management of Incidentally Detected Pulmonary Nodules in Adults                        DX-CHEST-PORTABLE (1 VIEW)   Final Result      No evidence of acute cardiopulmonary process.          X-Ray:  I have personally reviewed the images and compared with prior images.    Assessment/Plan:  Justification for Admission Status  I anticipate this patient is appropriate for observation status at this time because patient is here to rule out chest pain      * Pain in the chest  Assessment & Plan  Admit patient to telemetry.  Suspect that this is a reaction from her cortisone shot.  She states that she normally has some kind of reactions such as flushing and fatigue after she receives cortisone shots.  Her last stress test was over 10 years ago which she was told was unremarkable.  Stress test in a.m., prefers exercise stress test   Troponin x2 negative      Osteoarthritis  Assessment & Plan  Is following PCP outpatient.  Received steroid shots serially      Uncontrolled diabetes mellitus with hyperglycemia (HCC)  Assessment & Plan  Sliding scale     Morbid obesity (HCC)  Assessment & Plan  Will need counseling when clinically appropriate         VTE prophylaxis: pharmacologic prophylaxis contraindicated due to stress test in AM

## 2022-12-31 NOTE — ED NOTES
Complained of increasing chest pain. Medicated patient per MAR. Patient denies further needs. Call light within reach.

## 2022-12-31 NOTE — ED PROVIDER NOTES
ED Provider Note        CHIEF COMPLAINT  Chief Complaint   Patient presents with    Back Pain     Back pain started at 7 PM tonight and radiating to the chest. EKG from EMS show ST depression on leads V4-V6. Pain improved upon arrival at ER.       EXTERNAL RECORDS REVIEWED  Select: Outpatient Notes prior visits    HPI  LIMITATION TO HISTORY   Select: : None  OUTSIDE HISTORIAN(S):  Select: none    Cyndi Mota is a 72 y.o. female who presents evaluation of sudden onset back pain radiating to her chest.  Patient was sitting at home around 7 PM when she had a sudden onset of severe upper back pain that began to radiate to her chest and into her arms.  Is described as sharp severe initially 10 out of 10 there are no alleviating or exacerbating factors.  Had associated shortness of breath diaphoresis and nausea.  Patient does have a history of chronic low back pain, received a cortisone injection in her sacral region this evening though states her low back pain is at its baseline.  Denies any urinary retention loss of bowel or bladder control or saddle anesthesias.  No trauma or falls.  Has a history of hyperlipidemia and prediabetes, no known history of coronary artery disease or connective tissue disorders.  Has had no cough fevers.  No history of DVT PE.    REVIEW OF SYSTEMS  See HPI for further details. All other systems are negative.     PAST MEDICAL HISTORY       SURGICAL HISTORY  patient denies any surgical history    FAMILY HISTORY  No family history on file.    SOCIAL HISTORY  Social History     Tobacco Use    Smoking status: Never    Smokeless tobacco: Never   Substance and Sexual Activity    Alcohol use: Not on file    Drug use: Not on file    Sexual activity: Not on file       CURRENT MEDICATIONS  Home Medications    **Home medications have not yet been reviewed for this encounter**         ALLERGIES  Allergies   Allergen Reactions    Aspirin Hives    Celebrex [Celecoxib] Rash     All over body     "Fbvypfaf-Gzcaoxf-Btkkel [Fluocinolone]      Torn achilles heel    Levaquin      Torn achilles heel    Other Drug Vomiting and Nausea     \"most pain medication (specifically opiods)\"     Increased heart rate and upset stomach       PHYSICAL EXAM  VITAL SIGNS: /61   Pulse 90   Temp 36.7 °C (98 °F) (Temporal)   Resp (!) 25   Ht 1.6 m (5' 3\")   Wt 79.8 kg (176 lb)   SpO2 93%   BMI 31.18 kg/m²    Pulse ox interpretation: I interpret this pulse ox as normal.  VITALS - vital signs documented prior to this note have been reviewed and noted,  GENERAL - awake, alert, oriented, GCS 15, no apparent distress, non-toxic  appearing  HEENT - normocephalic, atraumatic, pupils equal, sclera anicteric, mucus  membranes moist  NECK - supple, no meningismus, full active range of motion, trachea midline  CARDIOVASCULAR - regular rate/rhythm, no murmurs/gallops/rubs  PULMONARY - no respiratory distress, speaking in full sentences, clear to  auscultation bilaterally, no wheezing/ronchi/rales, no accessory muscle use  GASTROINTESTINAL - soft, non-tender, non-distended, no rebound, guarding,  or peritonitis  Back: Patellar DTRs are 2+ bilaterally sensation in lower extremities is intact bilaterally lower extremity strength is 5/5 and no overlying rashes contusions abrasions on inspection of the back, no bony tenderness    GENITOURINARY - Deferred  NEUROLOGIC - Awake alert, normal mental status, speech fluid, cognition  normal, moves all extremities  MUSCULOSKELETAL - no obvious asymmetry or deformities present  EXTREMITIES - warm, well-perfused, no cyanosis or significant edema  DERMATOLOGIC - warm, dry, no rashes, no jaundice  PSYCHIATRIC - normal affect, normal insight, normal concentration    DIAGNOSTIC STUDIES / PROCEDURES  EKG  EKG shows a normal sinus rhythm At a rate of 96 normal MO QRS QTc interval normal axis no STEMI pattern 1 male ST depression in V2 V3 and V4 interpretation is sinus rhythm with possible anterior " ischemic changes    LABS  CBC CMP troponin all negative    RADIOLOGY  I have independently interpreted the diagnostic imaging associated with this visit and am waiting the final reading from the radiologist.   CTA negative for dissection    COURSE & MEDICAL DECISION MAKING  Pertinent Labs & Imaging studies reviewed. (See chart for details)    ED Observation Status? No; Patient does not meet criteria for ED Observation.     INITIAL ASSESSMENT AND PLAN  Patient presented for evaluation of back pain and chest pain.  Differential initially included was not limited to aortic dissection coronary artery disease pneumonia musculoskeletal strain or spasm less likely pulmonary embolism pneumonia or pneumothorax labs were ordered to evaluate for above given the sudden onset of back pain rating to her chest radiating down her arms a CTA was ordered fortunately was negative for dissection did discuss incidental findings noted including the pulmonary nodules as well as hemangioma fatty liver, with the patient at bedside.  Initial troponin is negative, patient's heart score is 5 based on her age history and risk factors thus I believe she warrants admission for further restratification of her chest pain.  She was agreeable to admission did speak with hospitalist Dr. Drew who graciously agreed to admit to her service patient will be admitted for further care and evaluation of her chest pain.      @HTN/IDDM FOLLOW UP:  The patient is referred to a primary physician for blood pressure management, diabetic screening, and for all other preventive health concerns@     Escalation of care considered, and ultimately not performed: IV fluids.     Barriers to care at this time, including but not limited to: Select: None .     Diagnostic tests and prescription drugs considered including, but not limited to: Select: None .           FINAL PROBLEM LIST AND PLAN  Chest pain admission    In addition to the chief complaint, the following  problems were addressed: Back pain    I have discussed management of the patient with the following physicians and SAVANNAH's: Dr. Drew    Discussion of management with other [facility] or appropriate source(s): Select: None     The patient will not drink alcohol nor drive with prescribed medications. The patient will return for worsening symptoms and is stable at the time of discharge. The patient verbalizes understanding and will comply.    FINAL IMPRESSION  1. Chest pain, unspecified type           Electronically signed by: Ferdinand Saucedo D.O., 12/30/2022 10:20 PM

## 2022-12-31 NOTE — DISCHARGE INSTRUCTIONS
Take your blood pressure once a day    Notify PCP about her blood pressure trend    Recheck hemoglobin A1c in 2 months    ===========================================================

## 2022-12-31 NOTE — ASSESSMENT & PLAN NOTE
Admit patient to telemetry.  Suspect that this is a reaction from her cortisone shot.  She states that she normally has some kind of reactions such as flushing and fatigue after she receives cortisone shots.  Her last stress test was over 10 years ago which she was told was unremarkable.  Stress test in a.m., prefers exercise stress test   Troponin x2 negative

## 2022-12-31 NOTE — PROGRESS NOTES
4 Eyes Skin Assessment Completed by MARI Bang and MARI Bill.    Head WDL  Ears WDL  Nose WDL  Mouth WDL  Neck WDL  Breast/Chest WDL  Shoulder Blades WDL  Spine WDL  (R) Arm/Elbow/Hand WDL  (L) Arm/Elbow/Hand WDL  Abdomen WDL  Groin WDL  Scrotum/Coccyx/Buttocks WDL  (R) Leg Abrasion  (L) Leg Abrasion  (R) Heel/Foot/Toe; Dry/Calloused   (L) Heel/Foot/Toe: Dry/Calloused           Devices In Places Tele Box and Pulse Ox      Interventions In Place Low Air Loss Mattress    Possible Skin Injury No    Pictures Uploaded Into Epic N/A  Wound Consult Placed N/A  RN Wound Prevention Protocol Ordered No

## 2022-12-31 NOTE — CARE PLAN
The patient is Watcher - Medium risk of patient condition declining or worsening    Shift Goals  Clinical Goals: Stress test, DC planning  Patient Goals: Rest, go home if ok    Progress made toward(s) clinical / shift goals:  No CP or back pain this am. Stress test scheduled.     Patient is not progressing towards the following goals:

## 2022-12-31 NOTE — ED NOTES
Med rec completed per patient and RX bottles at bedside (Bottles returned)  Allergies reviewed  No PO Antibiotics in the last 30 days

## 2023-01-01 NOTE — DISCHARGE SUMMARY
Discharge Summary    CHIEF COMPLAINT ON ADMISSION  Chief Complaint   Patient presents with    Back Pain     Back pain started at 7 PM tonight and radiating to the chest. EKG from EMS show ST depression on leads V4-V6. Pain improved upon arrival at ER.       Reason for Admission  Chest pain, rule out acute myocard*     Admission Date  12/30/2022    CODE STATUS  Prior    HPI & HOSPITAL COURSE  As per dr molina h+p    Cyndi Mota is a 72 y.o. female who presented 12/30/2022 with chest pain and back pain.  Patient has a history of osteoarthritis for which she follows up with primary care outpatient.  She normally receives steroid shots every few months.  Today she received 3 cortisone shots in her lower back.  She then drove home and several hours later she began to have back pain that radiated to her chest diffusely.  Describes the chest pain as heavy.  No associated diaphoresis nausea vomiting.  She states that she usually has a reaction to steroid shots such as flushing generalized weakness fatigue.  However she has not ever had chest pain like this after a steroid shot, this prompted her to come to the ED for evaluation.     Reports that she has had a stress test 10 years ago to which she was told it was unremarkable.  No history of cardiac stents placed.     In the ED, patient found to have normal vital signs.  Chest x-ray negative.  EKG showing normal sinus rhythm with no T wave changes to suggest ischemia.  CT angio of aorta negative for aneurysm and dissection.     Patient seen at bedside, states that she is currently chest pain-free and is asymptomatic at this time.  Stated that the morphine resolved her pain.    ==============================================    The patient was monitored on telemetry no evidence of arrhythmia.  We monitored troponins and patient underwent a Persantine thallium stress test.  No evidence of infarct or reversible ischemia.  Patient medically cleared for discharge on  12/31/2022      Therefore, she is discharged in good and stable condition to home with close outpatient follow-up.    The patient recovered much more quickly than anticipated on admission.    Discharge Date  12/31/2022    FOLLOW UP ITEMS POST DISCHARGE      DISCHARGE DIAGNOSES  Principal Problem (Resolved):    Pain in the chest POA: Yes  Active Problems:    Morbid obesity (HCC) POA: Yes    Osteoarthritis POA: Yes  Resolved Problems:    Uncontrolled diabetes mellitus with hyperglycemia (HCC) POA: Yes    Chest pain, rule out acute myocardial infarction POA: Yes      FOLLOW UP  No future appointments.  Juan Hernandez M.D.  [address] [facility] [address]  [phone]    Follow up        MEDICATIONS ON DISCHARGE     Medication List        CONTINUE taking these medications        Instructions   acetaminophen 500 MG Tabs  Commonly known as: TYLENOL   Take 1,000 mg by mouth every 6 hours as needed for Mild Pain.  Dose: 1,000 mg     alpha-lipoic acid 600 MG Caps   Take 1,200 mg by mouth every day at 6 PM.  Dose: 1,200 mg     buPROPion 150 MG XL tablet  Commonly known as: WELLBUTRIN XL   Take 150 mg by mouth every day.  Dose: 150 mg     CALCIUM PO   Take 1 Tablet by mouth every day.  Dose: 1 Tablet     Dialyvite Vitamin D 5000 5000 UNIT Caps  Generic drug: cholecalciferol   Take 5,000 Units by mouth every day.  Dose: 5,000 Units     diphenhydrAMINE 25 MG Tabs  Commonly known as: BENADRYL   Take 50 mg by mouth at bedtime as needed for Sleep.  Dose: 50 mg     Fenofibrate 200 MG Caps   Take 200 mg by mouth every day.  Dose: 200 mg     gabapentin 300 MG Caps  Commonly known as: NEURONTIN   Take 600-1,200 mg by mouth 3 times a day. 2 capsules (600 mg) in the morning  2 capsules (600 mg) in the afternoon  4 capsules (1200 mg) at bedtime  Dose: 600-1,200 mg     HAIR SKIN AND NAILS FORMULA PO   Take 1 Capsule by mouth every day.  Dose: 1 Capsule     levothyroxine 25 MCG Tabs  Commonly known as: SYNTHROID   Take 25 mcg  "by mouth every morning on an empty stomach.  Dose: 25 mcg     losartan 25 MG Tabs  Commonly known as: COZAAR   Take 25 mg by mouth every day.  Dose: 25 mg     Magnesium 200 MG Tabs   Take 200 mg by mouth every day.  Dose: 200 mg     metFORMIN  MG Tb24  Commonly known as: GLUCOPHAGE XR   Take 750 mg by mouth every day.  Dose: 750 mg     Non Formulary Request   Apply 1 Application topically every day. Estrogen and Progesterone Cream  Dose: 1 Application     Vitamin C 1000 MG Tabs   Take 1,000 mg by mouth every day.  Dose: 1,000 mg              Allergies  Allergies   Allergen Reactions    Aspirin Hives    Celebrex [Celecoxib] Rash     All over body    Lpvshpbq-Vxirrek-Pdhjxd [Fluocinolone] Myalgia     Torn achilles heel    Levaquin Myalgia     Torn achilles heel    Other Drug Vomiting and Nausea     \"most pain medication (specifically opiods)\"     Increased heart rate and upset stomach       DIET  No orders of the defined types were placed in this encounter.      ACTIVITY  As tolerated.  Weight bearing as tolerated    CONSULTATIONS      PROCEDURES  Jermain Reilly M.D.  [phone] 2022      Narrative & Impression                           Myocardial Perfusion   Report   NUCLEAR IMAGING INTERPRETATION   No evidence of significant jeopardized viable myocardium or prior myocardial    infarction.   Normal left ventricular size, ejection fraction, and wall motion.      MARIA DE JESUS RILEY      MRN:    [id number]         Gender:    F      Exam Date: 2022 06:30      Exam Location:      Inpatient      Ordering Phys:     ABDI CARRILLO      NucMed Tech:       Brant Tran RT                       (R,N)      Age:    72    :    1950        Ht (in):     63      Wt (lb):     174    BMI:    30.82       Radiologist      Risk Factors:             Diabetes, Hypertension      Indications:              Abnormal electrocardiogram ECG EKG      ICD Codes:                R94.31      Cardiac History:          " Positive risk factors, Palpitations      Cardiac Meds:      Meds Past 24 hrs:      Pretest Chest Pain:       No symptoms      STRESS TEST      Exercise   Protoc   Eder          Duration       08:16    METS:   10.   ol:                     (m:s):                          1                                    Post-Injection Exercise:        An additional 1 minutes of exercise followed   the                                    intravenous injection                     Resting HR (bpm):      69      Peak HR (bpm):         126      Resting BP (mmHg):       135    /   52      Peak BP (mmHg):       161   /   59      MaxPHR:     147     Target HR (bpm):       125      % MaxPHR:     86      Double Product:       20286      BP Response:      Stress Termination:       COMPLETED PROTOCOL,met target HR      Stress Symptoms:   SOB appropriate for effort,no chest discomfort,returning to baseline      ECG      Resting ECG:      Stress ECG:      IMAGE PROTOCOL      Rest/Stress 1                        Day              RadiopharmaceuticalDose (mCi)   Imaging  Date      Imaging  Time           Inj to Img Time (min)   Rest:   Tc-99m             7.3          31-Dec-2022        10:48           Tetrofosmin   Stress: Tc-99m             26.3         31-Dec-2022        11:34           Tetrofosmin      Rest:   Administration Site:       Right forearm   Administered by:      Brant PACHECO (R,N)      Stress:   Administration Site:       Right forearm   Administered by:      Brant PACHECO (R,N)      % Percent HR Achieved:        86   SPECT RESULTS      Technical Quality:       Good      Raw Data Analysis:   Summed Stress Score:    0   Summed Rest Score:    3   Summed Difference Score:        0   PERFUSION:   Normal myocardial perfusion with no ischemia.      FUNCTIONAL RESULTS (calculated via Gated SPECT)      Stress Image LV EF:        77     %      Upper Normal Limit      Stress EDV:      66     ml   EDVI:    36      ml/m2      Stress  ESV:      15     ml   ESVI:    8       ml/m2      TID:    0.84   TID - 1.19      TID (ed) - 1.23   LV Function:   Normal left ventricular wall motion.  LV ejection fraction = 77%.                           Jermain marielena Melba   (Electronically Signed)   Final Date:      31 December 2022                     12:41           Specimen Collected: 12/31/22  6:30 AM Last Resulted: 12/31/22 12:41 PM       Order Details     View Encounter     Lab and Collection Details     Routing     Result History - Result Edited     View Encounter Conversation           Linked Documents     View SynapseCV Report      Scans on Order [id number]    Scan on 12/31/2022 11:36 AM by Brant Tran: nucmed worksheet         Result Care Coordination      Patient Communication     Add Comments   Not seen Back to Top           NM-CARDIAC STRESS TEST [[id number]]    Electronically signed by: Winston Drew M.D. on 12/31/22 0228 Status: Completed   Ordering user: Winston Drew M.D. 12/31/22 0228 Ordering provider: Winston Drew M.D.   Authorized by: Winston Drew M.D. Ordered during: ED to Hosp-Admission (Discharged) on 12/30/2022    Add Signature Requirement   Frequency: Once 12/31/22 0229 - 1  occurrence   Questionnaire    Question Answer   Chemical or Treadmill? Treadmill   Preliminary Diagnosis Abnormal electrocardiogram [ECG] [EKG]   Release to patient Immediate   Order comments: Nothing to eat or drink for 4 hours prior to exam.  No caffeine for 24 hours prior to exam (decaf, coffee, cola, tea, chocolate, Excedrin, and Anacin).  No Viagra or other ED medications for 48 hours. If scheduled for Nuclear Medicine Treadmill-restrictions apply for Beta-blockers for 48 hours. 3 negative troponins. Patent IV required. Screening/Acknowledgement for Stress test done in [place].     Reprint Order Requisition    NM-CARDIAC STRESS TEST (Order #[id number]) on 12/31/22     Linked Documents     View SynapseCV Report     Last Resulted Time   Sat Dec  31, 2022 12:41 PM       Procedure Documentation Timeline (1/1/2023 10:44:03 to 1/1/2023 10:44:03)    Reading Provider Reading Date   No Reading Provider Prelim Dec 31, 2022   Jermain Reilly M.D. Dec 31, 2022     Signing Provider Signing Date Signing Time   Jermain Reilly M.D. Dec 31, 2022 12:41 PM       Charges     Quantity   TC99M TETROFOSMIN(MYOVIEW)DOSE 2     Order-Level Documents        LABORATORY  Lab Results   Component Value Date    SODIUM 141 12/30/2022    POTASSIUM 4.3 12/30/2022    CHLORIDE 105 12/30/2022    CO2 20 12/30/2022    GLUCOSE 256 (H) 12/30/2022    BUN 27 (H) 12/30/2022    CREATININE 1.13 12/30/2022        Lab Results   Component Value Date    WBC 9.4 12/30/2022    HEMOGLOBIN 13.7 12/30/2022    HEMATOCRIT 40.3 12/30/2022    PLATELETCT 181 12/30/2022        Patient admitted and discharged on the same date of 12/31/2022..  on the day of discharge greater than 55 minutes taken

## 2025-07-17 ENCOUNTER — APPOINTMENT (OUTPATIENT)
Dept: ADMISSIONS | Facility: MEDICAL CENTER | Age: 75
End: 2025-07-17
Attending: ORTHOPAEDIC SURGERY
Payer: MEDICARE

## 2025-07-18 ENCOUNTER — PRE-ADMISSION TESTING (OUTPATIENT)
Dept: ADMISSIONS | Facility: MEDICAL CENTER | Age: 75
End: 2025-07-18
Attending: ORTHOPAEDIC SURGERY
Payer: MEDICARE

## 2025-07-18 VITALS — HEIGHT: 64 IN | BODY MASS INDEX: 30.33 KG/M2

## 2025-07-18 DIAGNOSIS — Z01.812 PRE-OPERATIVE LABORATORY EXAMINATION: Primary | ICD-10-CM

## 2025-07-18 DIAGNOSIS — Z01.810 PRE-OPERATIVE CARDIOVASCULAR EXAMINATION: ICD-10-CM

## 2025-07-18 RX ORDER — AZELASTINE 1 MG/ML
2 SPRAY, METERED NASAL 2 TIMES DAILY PRN
COMMUNITY
Start: 2025-04-25

## 2025-07-18 RX ORDER — ONDANSETRON 4 MG/1
4 TABLET, ORALLY DISINTEGRATING ORAL EVERY 6 HOURS PRN
COMMUNITY
Start: 2025-06-18 | End: 2025-07-18

## 2025-07-18 RX ORDER — CLOBETASOL PROPIONATE 0.5 MG/G
OINTMENT TOPICAL PRN
COMMUNITY

## 2025-07-18 RX ORDER — LOSARTAN POTASSIUM 50 MG/1
TABLET ORAL
COMMUNITY
Start: 2025-05-04

## 2025-07-18 RX ORDER — CELECOXIB 100 MG/1
100 CAPSULE ORAL 2 TIMES DAILY
COMMUNITY

## 2025-07-18 RX ORDER — ATORVASTATIN CALCIUM 20 MG/1
TABLET, FILM COATED ORAL
COMMUNITY

## 2025-07-18 RX ORDER — ALBUTEROL SULFATE 90 UG/1
2 INHALANT RESPIRATORY (INHALATION) EVERY 6 HOURS PRN
COMMUNITY
Start: 2025-02-20

## 2025-07-18 RX ORDER — FEXOFENADINE HCL 180 MG/1
180 TABLET ORAL DAILY
COMMUNITY

## 2025-07-18 RX ORDER — MULTIVITAMIN
1 TABLET ORAL DAILY
COMMUNITY

## 2025-07-18 RX ORDER — OXYCODONE HYDROCHLORIDE 5 MG/1
TABLET ORAL
COMMUNITY
Start: 2025-06-17

## 2025-07-18 RX ORDER — CYCLOBENZAPRINE HCL 10 MG
TABLET ORAL
COMMUNITY

## 2025-07-18 NOTE — PREPROCEDURE INSTRUCTIONS
Pre-admit telephone appointment completed. Emailed pt  pre admit email and copy of AVS with med instructions.    Pt reports she became ill 6/16/25: body & skin aches, exhaustion, diarrhea & vomiting, denies fevers. States now feeling a little better and able to eat some without D&V. Pt aware to inform Dr Cota and also to call if symptoms worsen. Pt not to come in if any fevers, diarrhea, vomiting within 24 hours of surgery.    Called Dr Cota's office and left message for Elma DENNIS to inform of above.     Pt to get her preop testing in [place] on Monday.

## 2025-07-18 NOTE — PREADMIT AVS NOTE
Current Medications   Medication Instructions    azelastine (ASTELIN) 0.1 % nasal spray As needed medication, may take if needed, including morning of procedure     losartan (COZAAR) 50 MG Tab Stop 24 hours before surgery    oxyCODONE immediate-release (ROXICODONE) 5 MG Tab As needed medication, may take if needed, including morning of procedure     albuterol 108 (90 Base) MCG/ACT Aero Soln inhalation aerosol As needed medication, may take if needed, including morning of procedure     fexofenadine (ALLEGRA HIVES 24HR) 180 MG tablet Continue taking medication as prescribed, including morning of procedure     celecoxib (CELEBREX) 100 MG Cap Stop 5 days before surgery    clobetasol (TEMOVATE) 0.05 % Ointment Hold medication day of procedure    cyclobenzaprine (FLEXERIL) 10 MG Tab As needed medication, may take if needed, including morning of procedure     Multiple Vitamin (DAILY VITES) Tab Stop 7 days before surgery    atorvastatin (LIPITOR) 20 MG Tab Continue taking medication as prescribed, including morning of procedure     gabapentin (NEURONTIN) 300 MG Cap Continue taking medication as prescribed, including morning of procedure     metFORMIN ER (GLUCOPHAGE XR) 750 MG TABLET SR 24 HR Hold medication day of procedure    acetaminophen (TYLENOL) 500 MG Tab As needed medication, may take if needed, including morning of procedure     diphenhydrAMINE (BENADRYL) 25 MG Tab Continue taking as prescribed.    CALCIUM PO Stop 7 days before surgery    alpha-lipoic acid 600 MG Cap Stop 7 days before surgery    cholecalciferol (DIALYVITE VITAMIN D 5000) 5000 UNIT Cap Stop 7 days before surgery    Ascorbic Acid (VITAMIN C) 1000 MG Tab Stop 7 days before surgery    Non Formulary Request: estrogen progesterone cream Hold medication day of procedure

## 2025-07-24 ENCOUNTER — ANESTHESIA EVENT (OUTPATIENT)
Dept: SURGERY | Facility: MEDICAL CENTER | Age: 75
End: 2025-07-24
Payer: MEDICARE

## 2025-07-25 ENCOUNTER — ANESTHESIA (OUTPATIENT)
Dept: SURGERY | Facility: MEDICAL CENTER | Age: 75
End: 2025-07-25
Payer: MEDICARE

## 2025-07-25 ENCOUNTER — HOSPITAL ENCOUNTER (OUTPATIENT)
Facility: MEDICAL CENTER | Age: 75
End: 2025-07-25
Attending: ORTHOPAEDIC SURGERY | Admitting: ORTHOPAEDIC SURGERY
Payer: MEDICARE

## 2025-07-25 VITALS
TEMPERATURE: 96.8 F | DIASTOLIC BLOOD PRESSURE: 62 MMHG | RESPIRATION RATE: 18 BRPM | WEIGHT: 164.24 LBS | BODY MASS INDEX: 29.1 KG/M2 | HEART RATE: 72 BPM | OXYGEN SATURATION: 95 % | HEIGHT: 63 IN | SYSTOLIC BLOOD PRESSURE: 139 MMHG

## 2025-07-25 LAB
ERYTHROCYTE [DISTWIDTH] IN BLOOD BY AUTOMATED COUNT: 42.9 FL (ref 35.9–50)
GLUCOSE BLD STRIP.AUTO-MCNC: 126 MG/DL (ref 65–99)
HCT VFR BLD AUTO: 39.9 % (ref 37–47)
HGB BLD-MCNC: 13.4 G/DL (ref 12–16)
MCH RBC QN AUTO: 29.8 PG (ref 27–33)
MCHC RBC AUTO-ENTMCNC: 33.6 G/DL (ref 32.2–35.5)
MCV RBC AUTO: 88.7 FL (ref 81.4–97.8)
PLATELET # BLD AUTO: 193 K/UL (ref 164–446)
PMV BLD AUTO: 10.7 FL (ref 9–12.9)
RBC # BLD AUTO: 4.5 M/UL (ref 4.2–5.4)
WBC # BLD AUTO: 6.9 K/UL (ref 4.8–10.8)

## 2025-07-25 PROCEDURE — 160028 HCHG SURGERY MINUTES - 1ST 30 MINS LEVEL 3: Performed by: ORTHOPAEDIC SURGERY

## 2025-07-25 PROCEDURE — 700111 HCHG RX REV CODE 636 W/ 250 OVERRIDE (IP): Mod: JZ | Performed by: ANESTHESIOLOGY

## 2025-07-25 PROCEDURE — 502000 HCHG MISC OR IMPLANTS RC 0278: Performed by: ORTHOPAEDIC SURGERY

## 2025-07-25 PROCEDURE — 160025 RECOVERY II MINUTES (STATS): Performed by: ORTHOPAEDIC SURGERY

## 2025-07-25 PROCEDURE — 700105 HCHG RX REV CODE 258: Performed by: ANESTHESIOLOGY

## 2025-07-25 PROCEDURE — 700105 HCHG RX REV CODE 258: Performed by: ORTHOPAEDIC SURGERY

## 2025-07-25 PROCEDURE — 82962 GLUCOSE BLOOD TEST: CPT | Performed by: ORTHOPAEDIC SURGERY

## 2025-07-25 PROCEDURE — 85027 COMPLETE CBC AUTOMATED: CPT

## 2025-07-25 PROCEDURE — 700101 HCHG RX REV CODE 250: Performed by: ANESTHESIOLOGY

## 2025-07-25 PROCEDURE — 160191 HCHG ANESTHESIA STANDARD: Performed by: ORTHOPAEDIC SURGERY

## 2025-07-25 PROCEDURE — C1713 ANCHOR/SCREW BN/BN,TIS/BN: HCPCS | Performed by: ORTHOPAEDIC SURGERY

## 2025-07-25 PROCEDURE — 36415 COLL VENOUS BLD VENIPUNCTURE: CPT

## 2025-07-25 PROCEDURE — 160046 HCHG PACU - 1ST 60 MINS PHASE II: Performed by: ORTHOPAEDIC SURGERY

## 2025-07-25 PROCEDURE — 160039 HCHG SURGERY MINUTES - EA ADDL 1 MIN LEVEL 3: Performed by: ORTHOPAEDIC SURGERY

## 2025-07-25 PROCEDURE — 160015 HCHG STAT PREOP MINUTES: Performed by: ORTHOPAEDIC SURGERY

## 2025-07-25 PROCEDURE — 160193 HCHG PACU STANDARD - 1ST 60 MINS: Performed by: ORTHOPAEDIC SURGERY

## 2025-07-25 PROCEDURE — 700111 HCHG RX REV CODE 636 W/ 250 OVERRIDE (IP): Performed by: ORTHOPAEDIC SURGERY

## 2025-07-25 PROCEDURE — 160002 HCHG RECOVERY MINUTES (STAT): Performed by: ORTHOPAEDIC SURGERY

## 2025-07-25 PROCEDURE — 160048 HCHG OR STATISTICAL LEVEL 1-5: Performed by: ORTHOPAEDIC SURGERY

## 2025-07-25 DEVICE — IMPLANTABLE DEVICE: Type: IMPLANTABLE DEVICE | Site: WRIST | Status: FUNCTIONAL

## 2025-07-25 RX ORDER — HYDROMORPHONE HYDROCHLORIDE 1 MG/ML
0.2 INJECTION, SOLUTION INTRAMUSCULAR; INTRAVENOUS; SUBCUTANEOUS
Status: DISCONTINUED | OUTPATIENT
Start: 2025-07-25 | End: 2025-07-25 | Stop reason: HOSPADM

## 2025-07-25 RX ORDER — EPHEDRINE SULFATE 50 MG/ML
INJECTION, SOLUTION INTRAVENOUS PRN
Status: DISCONTINUED | OUTPATIENT
Start: 2025-07-25 | End: 2025-07-25 | Stop reason: SURG

## 2025-07-25 RX ORDER — SODIUM CHLORIDE, SODIUM LACTATE, POTASSIUM CHLORIDE, CALCIUM CHLORIDE 600; 310; 30; 20 MG/100ML; MG/100ML; MG/100ML; MG/100ML
INJECTION, SOLUTION INTRAVENOUS CONTINUOUS
Status: DISCONTINUED | OUTPATIENT
Start: 2025-07-25 | End: 2025-07-25 | Stop reason: HOSPADM

## 2025-07-25 RX ORDER — OXYCODONE HCL 5 MG/5 ML
5 SOLUTION, ORAL ORAL
Status: DISCONTINUED | OUTPATIENT
Start: 2025-07-25 | End: 2025-07-25 | Stop reason: HOSPADM

## 2025-07-25 RX ORDER — LABETALOL HYDROCHLORIDE 5 MG/ML
5 INJECTION, SOLUTION INTRAVENOUS
Status: DISCONTINUED | OUTPATIENT
Start: 2025-07-25 | End: 2025-07-25 | Stop reason: HOSPADM

## 2025-07-25 RX ORDER — HYDROMORPHONE HYDROCHLORIDE 1 MG/ML
0.5 INJECTION, SOLUTION INTRAMUSCULAR; INTRAVENOUS; SUBCUTANEOUS
Status: DISCONTINUED | OUTPATIENT
Start: 2025-07-25 | End: 2025-07-25 | Stop reason: HOSPADM

## 2025-07-25 RX ORDER — DEXAMETHASONE SODIUM PHOSPHATE 4 MG/ML
INJECTION, SOLUTION INTRA-ARTICULAR; INTRALESIONAL; INTRAMUSCULAR; INTRAVENOUS; SOFT TISSUE PRN
Status: DISCONTINUED | OUTPATIENT
Start: 2025-07-25 | End: 2025-07-25 | Stop reason: SURG

## 2025-07-25 RX ORDER — LIDOCAINE HYDROCHLORIDE 20 MG/ML
INJECTION, SOLUTION EPIDURAL; INFILTRATION; INTRACAUDAL; PERINEURAL PRN
Status: DISCONTINUED | OUTPATIENT
Start: 2025-07-25 | End: 2025-07-25 | Stop reason: SURG

## 2025-07-25 RX ORDER — CEFAZOLIN SODIUM 1 G/3ML
INJECTION, POWDER, FOR SOLUTION INTRAMUSCULAR; INTRAVENOUS PRN
Status: DISCONTINUED | OUTPATIENT
Start: 2025-07-25 | End: 2025-07-25 | Stop reason: SURG

## 2025-07-25 RX ORDER — EPHEDRINE SULFATE 50 MG/ML
5 INJECTION, SOLUTION INTRAVENOUS
Status: DISCONTINUED | OUTPATIENT
Start: 2025-07-25 | End: 2025-07-25 | Stop reason: HOSPADM

## 2025-07-25 RX ORDER — MIDAZOLAM HYDROCHLORIDE 1 MG/ML
1 INJECTION INTRAMUSCULAR; INTRAVENOUS
Status: DISCONTINUED | OUTPATIENT
Start: 2025-07-25 | End: 2025-07-25 | Stop reason: HOSPADM

## 2025-07-25 RX ORDER — HYDROMORPHONE HYDROCHLORIDE 1 MG/ML
0.4 INJECTION, SOLUTION INTRAMUSCULAR; INTRAVENOUS; SUBCUTANEOUS
Status: DISCONTINUED | OUTPATIENT
Start: 2025-07-25 | End: 2025-07-25 | Stop reason: HOSPADM

## 2025-07-25 RX ORDER — HALOPERIDOL 5 MG/ML
1 INJECTION INTRAMUSCULAR
Status: DISCONTINUED | OUTPATIENT
Start: 2025-07-25 | End: 2025-07-25 | Stop reason: HOSPADM

## 2025-07-25 RX ORDER — OXYCODONE HCL 5 MG/5 ML
10 SOLUTION, ORAL ORAL
Status: DISCONTINUED | OUTPATIENT
Start: 2025-07-25 | End: 2025-07-25 | Stop reason: HOSPADM

## 2025-07-25 RX ORDER — HYDROMORPHONE HYDROCHLORIDE 2 MG/ML
INJECTION, SOLUTION INTRAMUSCULAR; INTRAVENOUS; SUBCUTANEOUS PRN
Status: DISCONTINUED | OUTPATIENT
Start: 2025-07-25 | End: 2025-07-25 | Stop reason: SURG

## 2025-07-25 RX ORDER — ONDANSETRON 2 MG/ML
4 INJECTION INTRAMUSCULAR; INTRAVENOUS
Status: DISCONTINUED | OUTPATIENT
Start: 2025-07-25 | End: 2025-07-25 | Stop reason: HOSPADM

## 2025-07-25 RX ORDER — BUPIVACAINE HYDROCHLORIDE 5 MG/ML
INJECTION, SOLUTION EPIDURAL; INTRACAUDAL; PERINEURAL
Status: DISCONTINUED | OUTPATIENT
Start: 2025-07-25 | End: 2025-07-25 | Stop reason: HOSPADM

## 2025-07-25 RX ORDER — LIDOCAINE HYDROCHLORIDE 10 MG/ML
INJECTION, SOLUTION INFILTRATION; PERINEURAL
Status: DISCONTINUED | OUTPATIENT
Start: 2025-07-25 | End: 2025-07-25 | Stop reason: HOSPADM

## 2025-07-25 RX ORDER — KETOROLAC TROMETHAMINE 15 MG/ML
INJECTION, SOLUTION INTRAMUSCULAR; INTRAVENOUS PRN
Status: DISCONTINUED | OUTPATIENT
Start: 2025-07-25 | End: 2025-07-25 | Stop reason: SURG

## 2025-07-25 RX ORDER — ALBUTEROL SULFATE 5 MG/ML
2.5 SOLUTION RESPIRATORY (INHALATION)
Status: DISCONTINUED | OUTPATIENT
Start: 2025-07-25 | End: 2025-07-25 | Stop reason: HOSPADM

## 2025-07-25 RX ORDER — DIPHENHYDRAMINE HYDROCHLORIDE 50 MG/ML
12.5 INJECTION, SOLUTION INTRAMUSCULAR; INTRAVENOUS
Status: DISCONTINUED | OUTPATIENT
Start: 2025-07-25 | End: 2025-07-25 | Stop reason: HOSPADM

## 2025-07-25 RX ORDER — HYDRALAZINE HYDROCHLORIDE 20 MG/ML
5 INJECTION INTRAMUSCULAR; INTRAVENOUS
Status: DISCONTINUED | OUTPATIENT
Start: 2025-07-25 | End: 2025-07-25 | Stop reason: HOSPADM

## 2025-07-25 RX ORDER — SODIUM CHLORIDE, SODIUM LACTATE, POTASSIUM CHLORIDE, CALCIUM CHLORIDE 600; 310; 30; 20 MG/100ML; MG/100ML; MG/100ML; MG/100ML
INJECTION, SOLUTION INTRAVENOUS
Status: DISCONTINUED | OUTPATIENT
Start: 2025-07-25 | End: 2025-07-25 | Stop reason: SURG

## 2025-07-25 RX ORDER — ONDANSETRON 2 MG/ML
INJECTION INTRAMUSCULAR; INTRAVENOUS PRN
Status: DISCONTINUED | OUTPATIENT
Start: 2025-07-25 | End: 2025-07-25 | Stop reason: SURG

## 2025-07-25 RX ADMIN — DEXAMETHASONE SODIUM PHOSPHATE 4 MG: 4 INJECTION INTRA-ARTICULAR; INTRALESIONAL; INTRAMUSCULAR; INTRAVENOUS; SOFT TISSUE at 12:27

## 2025-07-25 RX ADMIN — SODIUM CHLORIDE, POTASSIUM CHLORIDE, SODIUM LACTATE AND CALCIUM CHLORIDE: 600; 310; 30; 20 INJECTION, SOLUTION INTRAVENOUS at 12:23

## 2025-07-25 RX ADMIN — EPHEDRINE SULFATE 5 MG: 50 INJECTION, SOLUTION INTRAVENOUS at 12:54

## 2025-07-25 RX ADMIN — SODIUM CHLORIDE, POTASSIUM CHLORIDE, SODIUM LACTATE AND CALCIUM CHLORIDE: 600; 310; 30; 20 INJECTION, SOLUTION INTRAVENOUS at 11:22

## 2025-07-25 RX ADMIN — KETOROLAC TROMETHAMINE 15 MG: 15 INJECTION, SOLUTION INTRAMUSCULAR; INTRAVENOUS at 13:03

## 2025-07-25 RX ADMIN — HYDROMORPHONE HYDROCHLORIDE 0.5 MG: 2 INJECTION INTRAMUSCULAR; INTRAVENOUS; SUBCUTANEOUS at 12:27

## 2025-07-25 RX ADMIN — HYDROMORPHONE HYDROCHLORIDE 0.5 MG: 2 INJECTION INTRAMUSCULAR; INTRAVENOUS; SUBCUTANEOUS at 12:41

## 2025-07-25 RX ADMIN — PROPOFOL 20 MG: 10 INJECTION, EMULSION INTRAVENOUS at 13:05

## 2025-07-25 RX ADMIN — LIDOCAINE HYDROCHLORIDE 60 MG: 20 INJECTION, SOLUTION EPIDURAL; INFILTRATION; INTRACAUDAL; PERINEURAL at 12:27

## 2025-07-25 RX ADMIN — ONDANSETRON 4 MG: 2 INJECTION INTRAMUSCULAR; INTRAVENOUS at 13:03

## 2025-07-25 RX ADMIN — PROPOFOL 150 MG: 10 INJECTION, EMULSION INTRAVENOUS at 12:27

## 2025-07-25 RX ADMIN — CEFAZOLIN 2 G: 1 INJECTION, POWDER, FOR SOLUTION INTRAMUSCULAR; INTRAVENOUS at 12:27

## 2025-07-25 RX ADMIN — PROPOFOL 30 MG: 10 INJECTION, EMULSION INTRAVENOUS at 12:41

## 2025-07-25 RX ADMIN — EPHEDRINE SULFATE 5 MG: 50 INJECTION, SOLUTION INTRAVENOUS at 12:35

## 2025-07-25 ASSESSMENT — PAIN DESCRIPTION - PAIN TYPE
TYPE: SURGICAL PAIN

## 2025-07-25 NOTE — ANESTHESIA TIME REPORT
Anesthesia Start and Stop Event Times       Date Time Event    7/25/2025 1130 Ready for Procedure     1223 Anesthesia Start     1316 Anesthesia Stop          Responsible Staff  07/25/25      Name Role Begin End    Kashmir Cabrera M.D. Anesth 1223 1316          Overtime Reason:  no overtime (within assigned shift)    Comments:

## 2025-07-25 NOTE — OR NURSING
1314 Pt arrived to PACU from OR. VSS. OPA with 6L Simple mask in place.   Pt is resting comfortably. Non-labored respirations. Pt is spontaneous breathing. Pt unable to follow commands.  No symptoms or complaints regarding pain or nausea at this time. STOP BANG in place.  Surgical dressing with cast placed in OR, CDI. Elevated and ice placed in PACU. Placed hearing aids into both ears.      1322 OPA D/C'd    1326 Called familyKanchan for updates. She's shopping at [employer]. Will be here at 1425.    1338 Pt is tolerating sips.     1405 Best IS 1500    1414 Pt meets criteria for phase II. Called for report, awaiting call back.     1431 Handoff to MARI Blevins.

## 2025-07-25 NOTE — ANESTHESIA POSTPROCEDURE EVALUATION
Patient: Cyndi Mota    Procedure Summary       Date: 07/25/25 Room / Location:  OR  / SURGERY [facility]    Anesthesia Start: 1223 Anesthesia Stop: 1316    Procedure: RIGHT REVISION OPEN CARPAL TUNNEL RELEASE (Wrist) Diagnosis: (CARPAL TUNNEL SYNDROME OF RIGHT WRIST)    Surgeons: Roberto Cota M.D. Responsible Provider: Kashmir Cabrera M.D.    Anesthesia Type: general ASA Status: 2            Final Anesthesia Type: general  Last vitals  BP   Blood Pressure : 125/59    Temp   36 °C (96.8 °F)    Pulse   71   Resp   12    SpO2   90 %      Anesthesia Post Evaluation    Patient location during evaluation: PACU  Patient participation: complete - patient participated  Level of consciousness: awake and alert    Airway patency: patent  Anesthetic complications: no  Cardiovascular status: hemodynamically stable  Respiratory status: acceptable  Hydration status: euvolemic    PONV: none          No notable events documented.     Nurse Pain Score: 0 (NPRS)

## 2025-07-25 NOTE — OP REPORT
OP Note    PreOp Diagnosis: Right carpal tunnel syndrome      PostOp Diagnosis: Same      Procedure(s):  RIGHT REVISION OPEN CARPAL TUNNEL RELEASE - Wound Class: Clean    Surgeon(s):  Roberto Cota M.D.    Anesthesiologist/Type of Anesthesia:  Anesthesiologist: Kashmir Cabrera M.D./General    Surgical Staff:  Circulator: Philly Rust R.N.  Scrub Person: Denver Iyer    Specimens removed if any:  * No specimens in log *    Estimated Blood Loss: Less than 10 cc    Findings: Small 1 to 1.5 mm band of tissue proximal to the wrist crease, with some hypervascularity noted around the median nerve.  Significant narrowing of the nerve.    Complications: None    Implants: Axogen nerve wrap, 6 cm x 3 cm.    Operative indications: Cyndi is a very pleasant 75-year-old female now 7 to 8 weeks status post a right revision open carpal tunnel release.  Patient's been having increasing severe paresthesias.  She did have a nerve conduction test showing worsening of her median nerve status post her open revision carpal tunnel release.  I discussed with her revision open carpal tunnel release.  Risk and benefits of the procedure again were discussed.  Risks including but not limited to damage to surrounding nerves, arteries, veins, infection, incomplete symptom relief, need for further surgery, CRPS.  Despite these risks the patient did consent.  I did discuss other wanting to wrap the nerve to allow for minimal scar tissue.  She does understand.    Operation in detail: On 7/25/2025 the patient was seen in the preoperative area.  Her right wrist was marked all of her questions were answered and consent was obtained.  The patient was brought to the operating and placed in the supine position.  General anesthetic was administered.  Preoperative Ancef was given.  Formal timeout was performed identifying the right wrist as the correct wrist as well as correct procedure.  Once sedated the right arm was prepped marked and draped  in the normal sterile fashion.  The limb was elevated exsanguinated with an Esmarch tourniquet inflated to 250 mmHg.  Using the previous incision and extending about 2 cm proximal to the wrist crease the skin was incised.  Full-thickness flaps were Through subcutaneous tissue.  The palmaris longus was identified and retracted.  The median nerve was then identified and traced distally.  Approximately 1 cm proximal to the distalmost wrist crease there was a small band of tissue that approximately 1 to 1.5 mm wide.  This showed significant constraint on the median nerve.  Upon release of the small band of tissue the nerve did expand to regular size.  The nerve was then traced distally down to the palmar fascia and fat.  The nerve was fully decompressed.  Working circumferentially around the nerve any synovium was removed.  The exigent nerve wrap was then gently and loosely placed around the median nerve, including the area of prior constraint.  The wound was copiously irrigated.  The tourniquet was released and hemostasis obtained with bipolar cautery.  The incision was closed in a layered fashion with 3-0 Monocryl 4-0 nylon interrupted sutures.  20 cc 1% lidocaine and percent bupivacaine plain was injected over the incision site.  A sterile dressing and a volar resting splint applied.  The patient tolerated the procedure well.  She was brought to the recovery room in stable condition.  She will follow-up with me in 1 to 2 weeks for reassessment.        7/25/2025 1:22 PM Roberto Cota M.D.

## 2025-07-25 NOTE — OR NURSING
1439 Report received from Felicity GUERRA.     1445: Pt arrived to bay 6 and ambulated to recliner with assistance. Pt then changed into clothing with assistance. Dressing to rt wrist CDI, sling in place.    1447: Family at bedside    1500 Discharge instructions reviewed with family at bedside, verbalized understanding and all questions answered. IV and ID bands removed.     1505: Pt escorted to car via wheelchair, accompanied by CNA. All personal belongings and discharge instructions with patient.

## 2025-07-25 NOTE — OR NURSING
1430 Report received from Laury GUERRA. Pt is on room air. She denies any pain or nausea at this time. Splint and ace wrap to right arm CDI.     1434 Pt meets criteria for stage 2.     1440 Report given to Edilia GUERRA in stage 2. Pt dressed and belongings in [facility].     1443 Pt transferred to Stage 2 via [facility].

## 2025-07-25 NOTE — ANESTHESIA PROCEDURE NOTES
Airway    Date/Time: 7/25/2025 12:30 PM    Performed by: Kashmir Cabrera M.D.  Authorized by: Kashmir Cabrera M.D.    Location:  OR  Urgency:  Elective  Indications for Airway Management:  Anesthesia      Spontaneous Ventilation: absent    Sedation Level:  Deep  Preoxygenated: Yes    Final Airway Type:  Supraglottic airway  Final Supraglottic Airway:  Standard LMA    SGA Size:  3  Number of Attempts at Approach:  1

## 2025-07-25 NOTE — OR NURSING
1026 PT TO PRE OP TO ASSUME CARE.    1142 Patient allergies and NPO status verified, home medication reconciliation completed and belongings secured. Patient verbalizes understanding of pain scale, expected course of stay and plan of care. Surgical site verified with patient. IV access established. Sequentials placed BLE

## 2025-07-25 NOTE — ANESTHESIA PREPROCEDURE EVALUATION
Case: [id number] Date/Time: 07/25/25 1145    Procedure: RIGHT REVISION OPEN CARPAL TUNNEL RELEASE    Pre-op diagnosis: CARPAL TUNNEL SYNDROME OF RIGHT WRIST    Location: [facility] OR 02 / SURGERY [facility]    Surgeons: Roberto Cota M.D.            Relevant Problems   Other   (positive) Osteoarthritis   (positive) Primary osteoarthritis of first carpometacarpal joint of left hand   (positive) Primary osteoarthritis of first carpometacarpal joint of right hand       Physical Exam    Airway   Mallampati: II  TM distance: >3 FB  Neck ROM: full       Cardiovascular - normal exam  Rhythm: regular  Rate: normal    (-) murmur     Dental - normal exam           Pulmonary - normal examBreath sounds clear to auscultation     Abdominal    Neurological - normal exam                   Anesthesia Plan    ASA 2       Plan - general       Airway plan will be LMA          Induction: intravenous    Postoperative Plan: Postoperative administration of opioids is intended.    Pertinent diagnostic labs and testing reviewed    Informed Consent:    Anesthetic plan and risks discussed with patient.    Use of blood products discussed with: patient whom consented to blood products.

## 2025-07-25 NOTE — DISCHARGE INSTRUCTIONS
Prescription given.  Last pain medication given at ____________________.    SPECIAL INSTRUCTIONS:  Carpal Tunnel Revision (OPEN)  ORTHO DISCHARGE INSTRUCTIONS:    ACTIVITY: RIGHT ARM:  WEIGHT BEARING AS TOLERATED  Only lift as much weight as feels comfortable on your injured arm.  Let pain be your guide. If you feel pain, place less weight on the arm.     ASSISTED DEVICES: You are being discharged with the following special equipment:  Cast    WOUND CARE:  You have a surgical wound that was closed with staples or sutures. These need to be removed 10-14 days after surgery.      SPLINT/CAST CARE:  If present, you should keep your splint or cast clean, dry, and intact. For bathing/showering, wrap the splint in a double plastic bag with tape around the upper part of the extremity to keep it dry.  Be aware that some spotting of the dressing with blood can occur and is normal. You should elevate your operative extremity to minimize swelling in your fingers.     ICE: Apply ice packs to the affected area (15 minutes on the arm, 15 minutes off the arm) for the first week, as you feel necessary to help with the pain and swelling.    ELEVATION: Keep your extremity elevated as much as possible, above your heart, to help control pain and swelling for at least 2 weeks. If the sensation in your fingers of your operative extremity changes, or the fingers change colors, or should your pain become too difficult to control, you should immediately elevate your operative extremity.  If these symptoms do not resolve after 30 minutes to 1 hour, you should seek medical care immediately.    IMMOBILIZATION:  You may move your fingers as your splint allows. Please wear your sling or brace at all times otherwise, which includes sleeping.            ACTIVITY: Rest and take it easy for the first 24 hours.  A responsible adult is recommended to remain with you during that time.  It is normal to feel sleepy.  We encourage you to not do anything  that requires balance, judgment or coordination.    MILD FLU-LIKE SYMPTOMS ARE NORMAL. YOU MAY EXPERIENCE GENERALIZED MUSCLE ACHES, THROAT IRRITATION, HEADACHE AND/OR SOME NAUSEA.    FOR 24 HOURS DO NOT:  Drive, operate machinery or run household appliances.  Drink beer or alcoholic beverages.   Make important decisions or sign legal documents.      DIET: To avoid nausea, slowly advance diet as tolerated, avoiding spicy or greasy foods for the first day.  Add more substantial food to your diet according to your physician's instructions.  Babies can be fed formula or breast milk as soon as they are hungry.  INCREASE FLUIDS AND FIBER TO AVOID CONSTIPATION.    FOLLOW-UP APPOINTMENT:  A follow-up appointment should be arranged with your doctor. Call to schedule.    You should CALL YOUR PHYSICIAN if you develop:  Fever greater than 101 degrees F.  Pain not relieved by medication, or persistent nausea or vomiting.  Excessive bleeding (blood soaking through dressing) or unexpected drainage from the wound.  Extreme redness or swelling around the incision site, drainage of pus or foul smelling drainage.  Inability to urinate or empty your bladder within 8 hours.  Problems with breathing or chest pain.    You should call 911 if you develop problems with breathing or chest pain.  If you are unable to contact your doctor or surgical center, you should go to the nearest emergency room or urgent care center.  Physician's telephone #: [phone]    If any questions arise, call your doctor.  If your doctor is not available, please feel free to call the [facility] at [phone].     A registered nurse may call you a few days after your surgery to see how you are doing after your procedure.    MEDICATIONS: Resume taking daily medication.  Take prescribed pain medication with food.  If no medication is prescribed, you may take non-aspirin pain medication if needed.  PAIN MEDICATION CAN BE VERY CONSTIPATING.  Take a stool  softener or laxative such as senokot, pericolace, or milk of magnesia if needed.      If your physician has prescribed pain medication that includes Acetaminophen (Tylenol), do not take additional Acetaminophen (Tylenol) while taking the prescribed medication.

## (undated) DEVICE — PACK UPPER EXTREMITY SM OR - (3/CA)

## (undated) DEVICE — SUTURE 4-0 ETHILON PS-2 18 (12PK/BX)"

## (undated) DEVICE — LACTATED RINGERS INJ 1000 ML - (14EA/CA 60CA/PF)

## (undated) DEVICE — SODIUM CHL IRRIGATION 0.9% 1000ML (12EA/CA)

## (undated) DEVICE — SUCTION INSTRUMENT YANKAUER BULBOUS TIP W/O VENT (50EA/CA)

## (undated) DEVICE — COVER LIGHT HANDLE FLEXIBLE - SOFT (2EA/PK 80PK/CA)

## (undated) DEVICE — TOWEL STOP TIMEOUT SAFETY FLAG (40EA/CA)

## (undated) DEVICE — SENSOR OXIMETER ADULT SPO2 RD SET (20EA/BX)

## (undated) DEVICE — CANISTER SUCTION 3000ML MECHANICAL FILTER AUTO SHUTOFF MEDI-VAC NONSTERILE LF DISP (40EA/CA)

## (undated) DEVICE — PAD PREP 24 X 48 CUFFED - (100/CA)

## (undated) DEVICE — SET EXTENSION WITH 2 PORTS (48EA/CA) ***PART #2C8610 IS A SUBSTITUTE*****

## (undated) DEVICE — SUTURE 4-0 MONOCRYL PLUS PS-2 - 27 INCH (36/BX)

## (undated) DEVICE — CHLORAPREP 26 ML APPLICATOR - ORANGE TINT(25/CA)

## (undated) DEVICE — ELECTRODE DUAL RETURN W/ CORD - (50/PK)

## (undated) DEVICE — STOCKINET BIAS 4 IN STERILE - (20/CA)

## (undated) DEVICE — TUBE CONNECTING SUCTION - CLEAR PLASTIC STERILE 72 IN (50EA/CA)

## (undated) DEVICE — TUBING CLEARLINK DUO-VENT - C-FLO (48EA/CA)

## (undated) DEVICE — PADDING CAST 4 IN STERILE - 4 X 4 YDS (24/CA)